# Patient Record
Sex: FEMALE | Race: BLACK OR AFRICAN AMERICAN | URBAN - METROPOLITAN AREA
[De-identification: names, ages, dates, MRNs, and addresses within clinical notes are randomized per-mention and may not be internally consistent; named-entity substitution may affect disease eponyms.]

---

## 2020-06-25 ENCOUNTER — LAB OUTSIDE AN ENCOUNTER (OUTPATIENT)
Dept: URBAN - METROPOLITAN AREA CLINIC 25 | Facility: CLINIC | Age: 45
End: 2020-06-25

## 2020-07-04 LAB
A/G RATIO: 1.1
ADALIMUMAB DRUG LEVEL: 13
ALBUMIN: 4.3
ALKALINE PHOSPHATASE: 54
ALT (SGPT): 11
ANTI-ADALIMUMAB ANTIBODY: 58
AST (SGOT): 21
BILIRUBIN, TOTAL: 0.3
BUN/CREATININE RATIO: 11
BUN: 9
C-REACTIVE PROTEIN, QUANT: 2
CALCIUM: 10.2
CARBON DIOXIDE, TOTAL: 23
CHLORIDE: 103
CREATININE: 0.81
EGFR IF AFRICN AM: 101
EGFR IF NONAFRICN AM: 88
FOLATE (FOLIC ACID), SERUM: 8.8
GLOBULIN, TOTAL: 3.8
GLUCOSE: 92
HBSAG SCREEN: NEGATIVE
HEMATOCRIT: 36
HEMOGLOBIN: 12
HEP B SURFACE AB, QUAL: NON REACTIVE
MCH: 27.5
MCHC: 33.3
MCV: 82
NRBC: (no result)
PLATELETS: 289
POTASSIUM: 3.6
PROTEIN, TOTAL: 8.1
QUANTIFERON CRITERIA: (no result)
QUANTIFERON INCUBATION: (no result)
QUANTIFERON MITOGEN VALUE: >10
QUANTIFERON NIL VALUE: 0.09
QUANTIFERON TB1 AG VALUE: 0.06
QUANTIFERON TB2 AG VALUE: 0.07
QUANTIFERON-TB GOLD PLUS: NEGATIVE
RBC: 4.37
RDW: 15.9
SODIUM: 141
VITAMIN B12: 157
VITAMIN D, 25-HYDROXY: 10.1
WBC: 4.6

## 2020-07-05 ENCOUNTER — LAB OUTSIDE AN ENCOUNTER (OUTPATIENT)
Dept: URBAN - METROPOLITAN AREA CLINIC 92 | Facility: CLINIC | Age: 45
End: 2020-07-05

## 2020-07-05 ENCOUNTER — TELEPHONE ENCOUNTER (OUTPATIENT)
Dept: URBAN - METROPOLITAN AREA CLINIC 92 | Facility: CLINIC | Age: 45
End: 2020-07-05

## 2020-07-05 RX ORDER — ERGOCALCIFEROL CAPSULES, 1.25 MG/1
1 CAPSULE CAPSULE ORAL
Qty: 8 CAPSULE | Refills: 0 | OUTPATIENT
Start: 2020-07-05 | End: 2020-08-30

## 2020-07-05 RX ORDER — CYANOCOBALAMIN (VITAMIN B-12) 500 MCG
2 TABLET TABLET ORAL ONCE A DAY
Qty: 90 | Refills: 3 | OUTPATIENT
Start: 2020-07-05 | End: 2021-06-30

## 2020-07-14 ENCOUNTER — OFFICE VISIT (OUTPATIENT)
Dept: URBAN - METROPOLITAN AREA CLINIC 83 | Facility: CLINIC | Age: 45
End: 2020-07-14
Payer: COMMERCIAL

## 2020-07-14 DIAGNOSIS — E53.8 VITAMIN B12 DEFICIENCY: ICD-10-CM

## 2020-07-14 PROCEDURE — 96372 THER/PROPH/DIAG INJ SC/IM: CPT | Performed by: INTERNAL MEDICINE

## 2020-07-14 RX ORDER — AZATHIOPRINE 50 1/1
TAKE 2 TABLETS (100 MG) BY ORAL ROUTE ONCE DAILY FOR 90 DAYS TABLET ORAL 1
Qty: 180 | Refills: 1 | Status: ACTIVE | COMMUNITY
Start: 2020-01-23 | End: 2020-07-21

## 2020-07-14 RX ORDER — CYANOCOBALAMIN (VITAMIN B-12) 500 MCG
2 TABLET TABLET ORAL ONCE A DAY
Qty: 90 | Refills: 3 | Status: ACTIVE | COMMUNITY
Start: 2020-07-05 | End: 2021-06-30

## 2020-07-14 RX ORDER — ERGOCALCIFEROL CAPSULES, 1.25 MG/1
1 CAPSULE CAPSULE ORAL
Qty: 8 CAPSULE | Refills: 0 | Status: ACTIVE | COMMUNITY
Start: 2020-07-05 | End: 2020-08-30

## 2020-07-21 ENCOUNTER — OFFICE VISIT (OUTPATIENT)
Dept: URBAN - METROPOLITAN AREA CLINIC 83 | Facility: CLINIC | Age: 45
End: 2020-07-21
Payer: COMMERCIAL

## 2020-07-21 DIAGNOSIS — E53.8 VITAMIN B12 DEFICIENCY: ICD-10-CM

## 2020-07-21 PROCEDURE — 96372 THER/PROPH/DIAG INJ SC/IM: CPT | Performed by: INTERNAL MEDICINE

## 2020-07-21 RX ORDER — AZATHIOPRINE 50 1/1
TAKE 2 TABLETS (100 MG) BY ORAL ROUTE ONCE DAILY FOR 90 DAYS TABLET ORAL 1
Qty: 180 | Refills: 1 | Status: ACTIVE | COMMUNITY
Start: 2020-01-23 | End: 2020-07-21

## 2020-07-21 RX ORDER — ERGOCALCIFEROL CAPSULES, 1.25 MG/1
1 CAPSULE CAPSULE ORAL
Qty: 8 CAPSULE | Refills: 0 | Status: ACTIVE | COMMUNITY
Start: 2020-07-05 | End: 2020-08-30

## 2020-07-21 RX ORDER — CYANOCOBALAMIN (VITAMIN B-12) 500 MCG
2 TABLET TABLET ORAL ONCE A DAY
Qty: 90 | Refills: 3 | Status: ACTIVE | COMMUNITY
Start: 2020-07-05 | End: 2021-06-30

## 2020-07-28 ENCOUNTER — OFFICE VISIT (OUTPATIENT)
Dept: URBAN - METROPOLITAN AREA CLINIC 83 | Facility: CLINIC | Age: 45
End: 2020-07-28
Payer: COMMERCIAL

## 2020-07-28 DIAGNOSIS — E53.8 VITAMIN B12 DEFICIENCY: ICD-10-CM

## 2020-07-28 PROCEDURE — 96372 THER/PROPH/DIAG INJ SC/IM: CPT | Performed by: INTERNAL MEDICINE

## 2020-07-30 ENCOUNTER — WEB ENCOUNTER (OUTPATIENT)
Dept: URBAN - METROPOLITAN AREA CLINIC 84 | Facility: CLINIC | Age: 45
End: 2020-07-30

## 2020-07-30 ENCOUNTER — OFFICE VISIT (OUTPATIENT)
Dept: URBAN - METROPOLITAN AREA CLINIC 92 | Facility: CLINIC | Age: 45
End: 2020-07-30
Payer: COMMERCIAL

## 2020-07-30 ENCOUNTER — OFFICE VISIT (OUTPATIENT)
Dept: URBAN - METROPOLITAN AREA CLINIC 84 | Facility: CLINIC | Age: 45
End: 2020-07-30
Payer: COMMERCIAL

## 2020-07-30 DIAGNOSIS — E53.8 VITAMIN B 12 DEFICIENCY: ICD-10-CM

## 2020-07-30 DIAGNOSIS — Z78.9 HEPATITIS B VACCINATION NOT UP TO DATE: ICD-10-CM

## 2020-07-30 DIAGNOSIS — Z79.899 ADALIMUMAB (HUMIRA) LONG-TERM USE: ICD-10-CM

## 2020-07-30 DIAGNOSIS — E56.9 VITAMIN D DEFICIENCY: ICD-10-CM

## 2020-07-30 DIAGNOSIS — K50.80 CROHN'S DISEASE OF BOTH SMALL AND LARGE INTESTINE: ICD-10-CM

## 2020-07-30 DIAGNOSIS — K44.9 HIATAL HERNIA - S/P SURGERY: ICD-10-CM

## 2020-07-30 DIAGNOSIS — Z23 ENCOUNTER FOR IMMUNIZATION: ICD-10-CM

## 2020-07-30 PROBLEM — 6051000175105: Status: ACTIVE | Noted: 2020-07-30

## 2020-07-30 PROCEDURE — 82607 VITAMIN B-12: CPT | Performed by: INTERNAL MEDICINE

## 2020-07-30 PROCEDURE — 90746 HEPB VACCINE 3 DOSE ADULT IM: CPT | Performed by: INTERNAL MEDICINE

## 2020-07-30 PROCEDURE — 82306 VITAMIN D 25 HYDROXY: CPT | Performed by: INTERNAL MEDICINE

## 2020-07-30 PROCEDURE — 99214 OFFICE O/P EST MOD 30 MIN: CPT | Performed by: INTERNAL MEDICINE

## 2020-07-30 PROCEDURE — 90471 IMMUNIZATION ADMIN: CPT | Performed by: INTERNAL MEDICINE

## 2020-07-30 RX ORDER — CYANOCOBALAMIN (VITAMIN B-12) 500 MCG
2 TABLET TABLET ORAL ONCE A DAY
Qty: 90 | Refills: 3 | Status: ACTIVE | COMMUNITY
Start: 2020-07-05 | End: 2021-06-30

## 2020-07-30 RX ORDER — ERGOCALCIFEROL CAPSULES, 1.25 MG/1
1 CAPSULE CAPSULE ORAL
Qty: 8 CAPSULE | Refills: 0 | Status: ACTIVE | COMMUNITY
Start: 2020-07-05 | End: 2020-08-30

## 2020-07-30 NOTE — HPI-OTHER HISTORIES
Jan 2020 -  missed humira dose for abt 4 weeks. took it this week on monday. ran out of imuran last week. bowel freq 2-3 times per day. occasional dysphagia.  Dec  2019 -  Was having trouble getting meds from pharmacy, missed humira for 3 weeks. took humira yesterday. remains  on imuran 100 mg daily. Having more diarrhea now than usual. no rectal bleeding. was feeling much better when was regular on humira and imuran. now experiecing flareup symptoms. no fever / abdominal pain.  Labs Nov 2019 : CR 0.66, T BILI <0.2, ALP 60, AST 22, ALT 13, WBC 4.3, HB (10.5), , lipase 41, CRP 1. Oct 2019 -  aug labs reviewed. normal wbc count. Hb 11. normal LFTs. normal amylase and lipase. no diarrhea. no abdominal pain. no rectal bleeding. compliant to meds.  Aug 2019 -  s/p repair of hiatal hernia + fundoplication. stopped meds on her own during periop period ( stopped in early june - restarted in late july). but continued imuran. now back on humira every week and imuran.  normal cbc. normal kidney and liver function. no abdominal pain. occasional diarrhea. no fever. no rectal bleeding.  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 Has bowel movements 1 to 2 times a day, no anal bleeding, no abdominal pain. Presently on humira and imuran and reports some relief of symptoms. Patient is scheduled to see thoracic surgeon and will likely stop the medicine one to two weeks prior to surgery as directed by thoracic surgeon. April 2019 -  CT enterography march 2019  -normal small bowel. suspected perianal / perirectal fistula. very large hiatal hernia containing most of the stomach / part of pancreas and spleen. heterogenous uterus.  Humira dose increased to weekly. Also added imuran. labs march 2019 - low vit D. normal B 12. normal lfts. low ferritin. Hb 9. Fecal calprotectin 106.  Bowels are more formed. feeling better. got injectafor 1 shot. getting 1 more dose next week.  March 2019 -  Colonoscopy in feb 2019 revealed post op anatomy, likely ileocolonic anastomosis in TC. patchy pseudopolyps in entire colon. TC diverticulosis. perianal fistula and skin tags. mucosal bridge in sigmoid colon. Moderately active inflammation was present in ileocolonic anastomosis Bx but remainder colon biopsy was normal. TPMT activity - normal. Slightly lower Humira drug levels ( 7.8).  Humira antibody 196 ( intermediate antibody level) Labs in dec 2018 - Hb 10.4, low b 12 166, normal CMP.  TB neg. Hep B neg. CRP elevated to 18.  low Vit D 10. Continues to have diarrhea. ALso describes abdominal discomfort. Pt feels crohns is not well controlled.  DEC 2018 - Patient was last seen in May 2018 by Dr. Rodriges. On Humira. Last labs from April 2018: hgb 10, normal LFT's, normal renal function.  EGD Nov 2017 : large hiatal hernia, Schatzki's ring. Has previously been on Remicade - but stopped working in Dec 2017. Colonoscopy June 2017 : inflammed colon, ulcerated iliocolonic anastamosis, normal neoti pseudopolyps in the colon noted. Path: confirmed inflammation from ileal biopsy, colonic biopsy did not reveal any active crohn's disease. CD was diagnosed at age 11. On Humira in early 2018. Feels like its working. Bowel freq 5-6 times per day, loose stools. No FH of colon cancer. noticing hypopigmentation on skin. s/p right hemicolectomy in 1989. Perianal fistula not draining as bad as before.

## 2020-07-30 NOTE — HPI-TODAY'S VISIT:
July 2020 : doing imuran 100 mg per day and weekly Humira. took meds for vit D def. on B 12 injections. feels CD is in better control. no abdominal pain. no diarrhea. occasional gerd. EGD in scheduled for next week.   Colonoscopy in March 2020 revealed normal neoterminal ileum.  3 large pedunculated pseudopolyps in the transverse colon which were biopsied extensively.  Benign intrinsic appearing stenosis at approximately 60 cm, biopsied, large polypoid lesion at 55 cm also biopsied.  Relatively normal-appearing rectum, rectosigmoid, sigmoid colon and descending colon.  Extensive perianal skin tags.  Perianal skin discoloration.  Multiple semi-pedunculated polyps in the sigmoid colon.  Ulceration at the ileocolonic anastomosis.  Biopsies from the anastomosis confirmed active colitis with mild activity.  Colonic stricture at 60 cm also revealed active colitis.  All other biopsies revealed benign mucosa.  MR enterography February 2020 Limited exam because of motion artifact, short segment 4 cm thickening and mural enhancement of the terminal ileum.  Short segment of narrowing of the distal sigmoid and hepatic flexure without inflammatory changes.  Long segment thickening of the descending colon.  Images were not obtained to the level of the rectum and anus to evaluate the suspected left perianal and perirectal fistula seen on prior CT.

## 2020-08-04 ENCOUNTER — CLAIMS CREATED FROM THE CLAIM WINDOW (OUTPATIENT)
Dept: URBAN - METROPOLITAN AREA CLINIC 83 | Facility: CLINIC | Age: 45
End: 2020-08-04
Payer: COMMERCIAL

## 2020-08-04 DIAGNOSIS — E53.8 VITAMIN B12 DEFICIENCY: ICD-10-CM

## 2020-08-04 DIAGNOSIS — D51.9 VITAMIN B12 DEFICIENCY ANEMIA, UNSPECIFIED: ICD-10-CM

## 2020-08-04 DIAGNOSIS — D51.0 VITAMIN B12 DEFICIENCY ANEMIA DUE TO INTRINSIC FACTOR DEFICIENCY: ICD-10-CM

## 2020-08-04 DIAGNOSIS — D51.1 VITAMIN B12 DEFICIENCY ANEMIA DUE TO MALABSORPTION WITH PROTEINURIA: ICD-10-CM

## 2020-08-04 PROCEDURE — 96372 THER/PROPH/DIAG INJ SC/IM: CPT | Performed by: INTERNAL MEDICINE

## 2020-08-04 RX ORDER — CYANOCOBALAMIN (VITAMIN B-12) 500 MCG
2 TABLET TABLET ORAL ONCE A DAY
Qty: 90 | Refills: 3 | Status: ACTIVE | COMMUNITY
Start: 2020-07-05 | End: 2021-06-30

## 2020-08-04 RX ORDER — ERGOCALCIFEROL CAPSULES, 1.25 MG/1
1 CAPSULE CAPSULE ORAL
Qty: 8 CAPSULE | Refills: 0 | Status: ACTIVE | COMMUNITY
Start: 2020-07-05 | End: 2020-08-30

## 2020-08-11 ENCOUNTER — OFFICE VISIT (OUTPATIENT)
Dept: URBAN - METROPOLITAN AREA CLINIC 83 | Facility: CLINIC | Age: 45
End: 2020-08-11
Payer: COMMERCIAL

## 2020-08-11 DIAGNOSIS — E53.8 VITAMIN B12 DEFICIENCY: ICD-10-CM

## 2020-08-11 PROCEDURE — 96372 THER/PROPH/DIAG INJ SC/IM: CPT | Performed by: INTERNAL MEDICINE

## 2020-08-11 RX ORDER — CYANOCOBALAMIN (VITAMIN B-12) 500 MCG
2 TABLET TABLET ORAL ONCE A DAY
Qty: 90 | Refills: 3 | Status: ACTIVE | COMMUNITY
Start: 2020-07-05 | End: 2021-06-30

## 2020-08-11 RX ORDER — ERGOCALCIFEROL CAPSULES, 1.25 MG/1
1 CAPSULE CAPSULE ORAL
Qty: 8 CAPSULE | Refills: 0 | Status: ACTIVE | COMMUNITY
Start: 2020-07-05 | End: 2020-08-30

## 2020-08-13 ENCOUNTER — OFFICE VISIT (OUTPATIENT)
Dept: URBAN - METROPOLITAN AREA SURGERY CENTER 20 | Facility: SURGERY CENTER | Age: 45
End: 2020-08-13
Payer: COMMERCIAL

## 2020-08-13 DIAGNOSIS — K21.9 ACID REFLUX: ICD-10-CM

## 2020-08-13 PROCEDURE — G8907 PT DOC NO EVENTS ON DISCHARG: HCPCS | Performed by: INTERNAL MEDICINE

## 2020-08-13 PROCEDURE — 43235 EGD DIAGNOSTIC BRUSH WASH: CPT | Performed by: INTERNAL MEDICINE

## 2020-08-13 RX ORDER — CYANOCOBALAMIN (VITAMIN B-12) 500 MCG
2 TABLET TABLET ORAL ONCE A DAY
Qty: 90 | Refills: 3 | Status: ACTIVE | COMMUNITY
Start: 2020-07-05 | End: 2021-06-30

## 2020-08-13 RX ORDER — ERGOCALCIFEROL CAPSULES, 1.25 MG/1
1 CAPSULE CAPSULE ORAL
Qty: 8 CAPSULE | Refills: 0 | Status: ACTIVE | COMMUNITY
Start: 2020-07-05 | End: 2020-08-30

## 2020-08-18 ENCOUNTER — OFFICE VISIT (OUTPATIENT)
Dept: URBAN - METROPOLITAN AREA CLINIC 83 | Facility: CLINIC | Age: 45
End: 2020-08-18
Payer: COMMERCIAL

## 2020-08-18 DIAGNOSIS — E53.8 VITAMIN B12 DEFICIENCY: ICD-10-CM

## 2020-08-18 PROCEDURE — 96372 THER/PROPH/DIAG INJ SC/IM: CPT | Performed by: INTERNAL MEDICINE

## 2020-08-18 RX ORDER — ERGOCALCIFEROL CAPSULES, 1.25 MG/1
1 CAPSULE CAPSULE ORAL
Qty: 8 CAPSULE | Refills: 0 | Status: ACTIVE | COMMUNITY
Start: 2020-07-05 | End: 2020-08-30

## 2020-08-18 RX ORDER — CYANOCOBALAMIN (VITAMIN B-12) 500 MCG
2 TABLET TABLET ORAL ONCE A DAY
Qty: 90 | Refills: 3 | Status: ACTIVE | COMMUNITY
Start: 2020-07-05 | End: 2021-06-30

## 2020-08-25 ENCOUNTER — OFFICE VISIT (OUTPATIENT)
Dept: URBAN - METROPOLITAN AREA CLINIC 83 | Facility: CLINIC | Age: 45
End: 2020-08-25

## 2020-08-31 ENCOUNTER — OFFICE VISIT (OUTPATIENT)
Dept: URBAN - METROPOLITAN AREA CLINIC 83 | Facility: CLINIC | Age: 45
End: 2020-08-31

## 2020-09-01 ENCOUNTER — OFFICE VISIT (OUTPATIENT)
Dept: URBAN - METROPOLITAN AREA CLINIC 83 | Facility: CLINIC | Age: 45
End: 2020-09-01
Payer: COMMERCIAL

## 2020-09-01 DIAGNOSIS — Z23 ENCOUNTER FOR IMMUNIZATION: ICD-10-CM

## 2020-09-01 DIAGNOSIS — E53.8 VITAMIN B12 DEFICIENCY: ICD-10-CM

## 2020-09-01 PROCEDURE — 90746 HEPB VACCINE 3 DOSE ADULT IM: CPT | Performed by: INTERNAL MEDICINE

## 2020-09-01 PROCEDURE — 90471 IMMUNIZATION ADMIN: CPT | Performed by: INTERNAL MEDICINE

## 2020-09-01 PROCEDURE — 96372 THER/PROPH/DIAG INJ SC/IM: CPT | Performed by: INTERNAL MEDICINE

## 2020-09-01 RX ORDER — CYANOCOBALAMIN (VITAMIN B-12) 500 MCG
2 TABLET TABLET ORAL ONCE A DAY
Qty: 90 | Refills: 3 | Status: ACTIVE | COMMUNITY
Start: 2020-07-05 | End: 2021-06-30

## 2020-10-06 ENCOUNTER — OFFICE VISIT (OUTPATIENT)
Dept: URBAN - METROPOLITAN AREA CLINIC 83 | Facility: CLINIC | Age: 45
End: 2020-10-06
Payer: COMMERCIAL

## 2020-10-06 DIAGNOSIS — E53.8 VITAMIN B12 DEFICIENCY: ICD-10-CM

## 2020-10-06 PROCEDURE — 96372 THER/PROPH/DIAG INJ SC/IM: CPT | Performed by: INTERNAL MEDICINE

## 2020-10-06 RX ORDER — CYANOCOBALAMIN (VITAMIN B-12) 500 MCG
2 TABLET TABLET ORAL ONCE A DAY
Qty: 90 | Refills: 3 | Status: ACTIVE | COMMUNITY
Start: 2020-07-05 | End: 2021-06-30

## 2020-10-27 ENCOUNTER — LAB OUTSIDE AN ENCOUNTER (OUTPATIENT)
Dept: URBAN - METROPOLITAN AREA CLINIC 25 | Facility: CLINIC | Age: 45
End: 2020-10-27

## 2020-10-28 ENCOUNTER — TELEPHONE ENCOUNTER (OUTPATIENT)
Dept: URBAN - METROPOLITAN AREA CLINIC 92 | Facility: CLINIC | Age: 45
End: 2020-10-28

## 2020-10-28 LAB
VITAMIN B12: 349
VITAMIN D, 25-HYDROXY: 16.9

## 2020-10-28 RX ORDER — ERGOCALCIFEROL CAPSULES, 1.25 MG/1
1 CAPSULE CAPSULE ORAL
Qty: 8 CAPSULE | Refills: 0
Start: 2020-07-05

## 2020-10-28 RX ORDER — ADALIMUMAB 40MG/0.4ML
1 SUBQ  EVERY WEEK KIT SUBCUTANEOUS
Qty: 4 | Refills: 12 | OUTPATIENT

## 2020-10-29 PROBLEM — 71833008 CROHN'S DISEASE OF SMALL AND LARGE INTESTINES: Status: ACTIVE | Noted: 2020-10-29

## 2020-11-03 ENCOUNTER — OFFICE VISIT (OUTPATIENT)
Dept: URBAN - METROPOLITAN AREA CLINIC 83 | Facility: CLINIC | Age: 45
End: 2020-11-03

## 2020-11-04 ENCOUNTER — OFFICE VISIT (OUTPATIENT)
Dept: URBAN - METROPOLITAN AREA CLINIC 83 | Facility: CLINIC | Age: 45
End: 2020-11-04

## 2020-11-05 ENCOUNTER — TELEPHONE ENCOUNTER (OUTPATIENT)
Dept: URBAN - METROPOLITAN AREA CLINIC 25 | Facility: CLINIC | Age: 45
End: 2020-11-05

## 2020-11-12 ENCOUNTER — TELEPHONE ENCOUNTER (OUTPATIENT)
Dept: URBAN - METROPOLITAN AREA CLINIC 25 | Facility: CLINIC | Age: 45
End: 2020-11-12

## 2020-11-25 ENCOUNTER — TELEPHONE ENCOUNTER (OUTPATIENT)
Dept: URBAN - METROPOLITAN AREA CLINIC 25 | Facility: CLINIC | Age: 45
End: 2020-11-25

## 2020-12-08 ENCOUNTER — OFFICE VISIT (OUTPATIENT)
Dept: URBAN - METROPOLITAN AREA CLINIC 83 | Facility: CLINIC | Age: 45
End: 2020-12-08
Payer: COMMERCIAL

## 2020-12-08 DIAGNOSIS — Z12.11 COLON CANCER SCREENING: ICD-10-CM

## 2020-12-08 PROCEDURE — 993 AGA: Performed by: INTERNAL MEDICINE

## 2020-12-08 RX ORDER — ERGOCALCIFEROL CAPSULES, 1.25 MG/1
1 CAPSULE CAPSULE ORAL
Qty: 8 CAPSULE | Refills: 0 | Status: ACTIVE | COMMUNITY
Start: 2020-07-05

## 2020-12-08 RX ORDER — ADALIMUMAB 40MG/0.4ML
1 SUBQ  EVERY WEEK KIT SUBCUTANEOUS
Qty: 4 | Refills: 12 | Status: ACTIVE | COMMUNITY

## 2020-12-08 RX ORDER — CYANOCOBALAMIN (VITAMIN B-12) 500 MCG
2 TABLET TABLET ORAL ONCE A DAY
Qty: 90 | Refills: 3 | Status: ACTIVE | COMMUNITY
Start: 2020-07-05 | End: 2021-06-30

## 2020-12-29 ENCOUNTER — TELEPHONE ENCOUNTER (OUTPATIENT)
Dept: URBAN - METROPOLITAN AREA CLINIC 92 | Facility: CLINIC | Age: 45
End: 2020-12-29

## 2020-12-29 RX ORDER — ADALIMUMAB 40MG/0.4ML
1 SUBQ  EVERY WEEK KIT SUBCUTANEOUS
Qty: 4 | Refills: 12

## 2021-01-05 ENCOUNTER — OFFICE VISIT (OUTPATIENT)
Dept: URBAN - METROPOLITAN AREA CLINIC 83 | Facility: CLINIC | Age: 46
End: 2021-01-05
Payer: COMMERCIAL

## 2021-01-05 DIAGNOSIS — E53.8 B12 DEFICIENCY: ICD-10-CM

## 2021-01-05 PROCEDURE — 96372 THER/PROPH/DIAG INJ SC/IM: CPT | Performed by: INTERNAL MEDICINE

## 2021-01-05 RX ORDER — ADALIMUMAB 40MG/0.4ML
1 SUBQ  EVERY WEEK KIT SUBCUTANEOUS
Qty: 4 | Refills: 12 | Status: ACTIVE | COMMUNITY

## 2021-01-05 RX ORDER — CYANOCOBALAMIN (VITAMIN B-12) 500 MCG
2 TABLET TABLET ORAL ONCE A DAY
Qty: 90 | Refills: 3 | Status: ACTIVE | COMMUNITY
Start: 2020-07-05 | End: 2021-06-30

## 2021-01-05 RX ORDER — ERGOCALCIFEROL CAPSULES, 1.25 MG/1
1 CAPSULE CAPSULE ORAL
Qty: 8 CAPSULE | Refills: 0 | Status: ACTIVE | COMMUNITY
Start: 2020-07-05

## 2021-02-02 ENCOUNTER — OFFICE VISIT (OUTPATIENT)
Dept: URBAN - METROPOLITAN AREA CLINIC 83 | Facility: CLINIC | Age: 46
End: 2021-02-02
Payer: COMMERCIAL

## 2021-02-02 DIAGNOSIS — Z12.11 COLON CANCER SCREENING: ICD-10-CM

## 2021-02-02 PROCEDURE — 993 AGA: Performed by: INTERNAL MEDICINE

## 2021-02-02 RX ORDER — ADALIMUMAB 40MG/0.4ML
1 SUBQ  EVERY WEEK KIT SUBCUTANEOUS
Qty: 4 | Refills: 12 | Status: ACTIVE | COMMUNITY

## 2021-02-02 RX ORDER — ERGOCALCIFEROL CAPSULES, 1.25 MG/1
1 CAPSULE CAPSULE ORAL
Qty: 8 CAPSULE | Refills: 0 | Status: ACTIVE | COMMUNITY
Start: 2020-07-05

## 2021-02-02 RX ORDER — CYANOCOBALAMIN (VITAMIN B-12) 500 MCG
2 TABLET TABLET ORAL ONCE A DAY
Qty: 90 | Refills: 3 | Status: ACTIVE | COMMUNITY
Start: 2020-07-05 | End: 2021-06-30

## 2021-02-12 ENCOUNTER — OFFICE VISIT (OUTPATIENT)
Dept: URBAN - METROPOLITAN AREA CLINIC 25 | Facility: CLINIC | Age: 46
End: 2021-02-12

## 2021-02-12 NOTE — HPI-TODAY'S VISIT:
February 2021 Normal vitamin B12, low vitamin D at 16.9, TB test negative in June 2020, Humira drug levels 13, antibody levels low titer at 58, CRP 2. EGD August 2020 reveals a small amount of food in the gastric body, evidence of fundoplication, normal esophagus, no biopsies.

## 2021-02-12 NOTE — HPI-OTHER HISTORIES
July 2020 : doing imuran 100 mg per day and weekly Humira. took meds for vit D def. on B 12 injections. feels CD is in better control. no abdominal pain. no diarrhea. occasional gerd. EGD in scheduled for next week. Colonoscopy in March 2020 revealed normal neoterminal ileum.  3 large pedunculated pseudopolyps in the transverse colon which were biopsied extensively.  Benign intrinsic appearing stenosis at approximately 60 cm, biopsied, large polypoid lesion at 55 cm also biopsied.  Relatively normal-appearing rectum, rectosigmoid, sigmoid colon and descending colon.  Extensive perianal skin tags.  Perianal skin discoloration.  Multiple semi-pedunculated polyps in the sigmoid colon.  Ulceration at the ileocolonic anastomosis. Biopsies from the anastomosis confirmed active colitis with mild activity.  Colonic stricture at 60 cm also revealed active colitis.  All other biopsies revealed benign mucosa. MR enterography February 2020 Limited exam because of motion artifact, short segment 4 cm thickening and mural enhancement of the terminal ileum.  Short segment of narrowing of the distal sigmoid and hepatic flexure without inflammatory changes.  Long segment thickening of the descending colon.  Images were not obtained to the level of the rectum and anus to evaluate the suspected left perianal and perirectal fistula seen on prior CT. Jan 2020 -  missed humira dose for abt 4 weeks. took it this week on monday. ran out of imuran last week. bowel freq 2-3 times per day. occasional dysphagia.  Dec  2019 -  Was having trouble getting meds from pharmacy, missed humira for 3 weeks. took humira yesterday. remains  on imuran 100 mg daily. Having more diarrhea now than usual. no rectal bleeding. was feeling much better when was regular on humira and imuran. now experiecing flareup symptoms. no fever / abdominal pain.  Labs Nov 2019 : CR 0.66, T BILI <0.2, ALP 60, AST 22, ALT 13, WBC 4.3, HB (10.5), , lipase 41, CRP 1. Oct 2019 -  aug labs reviewed. normal wbc count. Hb 11. normal LFTs. normal amylase and lipase. no diarrhea. no abdominal pain. no rectal bleeding. compliant to meds.  Aug 2019 -  s/p repair of hiatal hernia + fundoplication. stopped meds on her own during periop period ( stopped in early june - restarted in late july). but continued imuran. now back on humira every week and imuran.  normal cbc. normal kidney and liver function. no abdominal pain. occasional diarrhea. no fever. no rectal bleeding.  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 Has bowel movements 1 to 2 times a day, no anal bleeding, no abdominal pain. Presently on humira and imuran and reports some relief of symptoms. Patient is scheduled to see thoracic surgeon and will likely stop the medicine one to two weeks prior to surgery as directed by thoracic surgeon. April 2019 -  CT enterography march 2019  -normal small bowel. suspected perianal / perirectal fistula. very large hiatal hernia containing most of the stomach / part of pancreas and spleen. heterogenous uterus.  Humira dose increased to weekly. Also added imuran. labs march 2019 - low vit D. normal B 12. normal lfts. low ferritin. Hb 9. Fecal calprotectin 106.  Bowels are more formed. feeling better. got injectafor 1 shot. getting 1 more dose next week.  March 2019 -  Colonoscopy in feb 2019 revealed post op anatomy, likely ileocolonic anastomosis in TC. patchy pseudopolyps in entire colon. TC diverticulosis. perianal fistula and skin tags. mucosal bridge in sigmoid colon. Moderately active inflammation was present in ileocolonic anastomosis Bx but remainder colon biopsy was normal. TPMT activity - normal. Slightly lower Humira drug levels ( 7.8).  Humira antibody 196 ( intermediate antibody level) Labs in dec 2018 - Hb 10.4, low b 12 166, normal CMP.  TB neg. Hep B neg. CRP elevated to 18.  low Vit D 10. Continues to have diarrhea. ALso describes abdominal discomfort. Pt feels crohns is not well controlled.  DEC 2018 - Patient was last seen in May 2018 by Dr. Rodriges. On Humira. Last labs from April 2018: hgb 10, normal LFT's, normal renal function.  EGD Nov 2017 : large hiatal hernia, Schatzki's ring. Has previously been on Remicade - but stopped working in Dec 2017. Colonoscopy June 2017 : inflammed colon, ulcerated iliocolonic anastamosis, normal neoti pseudopolyps in the colon noted. Path: confirmed inflammation from ileal biopsy, colonic biopsy did not reveal any active crohn's disease. CD was diagnosed at age 11. On Humira in early 2018. Feels like its working. Bowel freq 5-6 times per day, loose stools. No FH of colon cancer. noticing hypopigmentation on skin. s/p right hemicolectomy in 1989. Perianal fistula not draining as bad as before.

## 2021-03-02 ENCOUNTER — OFFICE VISIT (OUTPATIENT)
Dept: URBAN - METROPOLITAN AREA CLINIC 83 | Facility: CLINIC | Age: 46
End: 2021-03-02
Payer: COMMERCIAL

## 2021-03-02 DIAGNOSIS — Z23 ENCOUNTER FOR IMMUNIZATION: ICD-10-CM

## 2021-03-02 PROCEDURE — 90746 HEPB VACCINE 3 DOSE ADULT IM: CPT | Performed by: INTERNAL MEDICINE

## 2021-03-02 PROCEDURE — 90471 IMMUNIZATION ADMIN: CPT | Performed by: INTERNAL MEDICINE

## 2021-03-02 RX ORDER — ADALIMUMAB 40MG/0.4ML
1 SUBQ  EVERY WEEK KIT SUBCUTANEOUS
Qty: 4 | Refills: 12 | Status: ACTIVE | COMMUNITY

## 2021-03-02 RX ORDER — CYANOCOBALAMIN (VITAMIN B-12) 500 MCG
2 TABLET TABLET ORAL ONCE A DAY
Qty: 90 | Refills: 3 | Status: ACTIVE | COMMUNITY
Start: 2020-07-05 | End: 2021-06-30

## 2021-03-02 RX ORDER — ERGOCALCIFEROL CAPSULES, 1.25 MG/1
1 CAPSULE CAPSULE ORAL
Qty: 8 CAPSULE | Refills: 0 | Status: ACTIVE | COMMUNITY
Start: 2020-07-05

## 2021-03-09 ENCOUNTER — OFFICE VISIT (OUTPATIENT)
Dept: URBAN - METROPOLITAN AREA CLINIC 83 | Facility: CLINIC | Age: 46
End: 2021-03-09
Payer: COMMERCIAL

## 2021-03-09 DIAGNOSIS — E53.8 VITAMIN B12 DEFICIENCY: ICD-10-CM

## 2021-03-09 PROCEDURE — 96372 THER/PROPH/DIAG INJ SC/IM: CPT | Performed by: INTERNAL MEDICINE

## 2021-03-09 RX ORDER — ERGOCALCIFEROL CAPSULES, 1.25 MG/1
1 CAPSULE CAPSULE ORAL
Qty: 8 CAPSULE | Refills: 0 | Status: ACTIVE | COMMUNITY
Start: 2020-07-05

## 2021-03-09 RX ORDER — ADALIMUMAB 40MG/0.4ML
1 SUBQ  EVERY WEEK KIT SUBCUTANEOUS
Qty: 4 | Refills: 12 | Status: ACTIVE | COMMUNITY

## 2021-03-09 RX ORDER — CYANOCOBALAMIN (VITAMIN B-12) 500 MCG
2 TABLET TABLET ORAL ONCE A DAY
Qty: 90 | Refills: 3 | Status: ACTIVE | COMMUNITY
Start: 2020-07-05 | End: 2021-06-30

## 2021-03-26 ENCOUNTER — TELEPHONE ENCOUNTER (OUTPATIENT)
Dept: URBAN - METROPOLITAN AREA CLINIC 92 | Facility: CLINIC | Age: 46
End: 2021-03-26

## 2021-04-29 ENCOUNTER — WEB ENCOUNTER (OUTPATIENT)
Dept: URBAN - METROPOLITAN AREA CLINIC 84 | Facility: CLINIC | Age: 46
End: 2021-04-29

## 2021-04-29 ENCOUNTER — OFFICE VISIT (OUTPATIENT)
Dept: URBAN - METROPOLITAN AREA CLINIC 83 | Facility: CLINIC | Age: 46
End: 2021-04-29
Payer: COMMERCIAL

## 2021-04-29 ENCOUNTER — OFFICE VISIT (OUTPATIENT)
Dept: URBAN - METROPOLITAN AREA CLINIC 84 | Facility: CLINIC | Age: 46
End: 2021-04-29
Payer: COMMERCIAL

## 2021-04-29 DIAGNOSIS — Z79.899 ADALIMUMAB (HUMIRA) LONG-TERM USE: ICD-10-CM

## 2021-04-29 DIAGNOSIS — E53.8 VITAMIN B 12 DEFICIENCY: ICD-10-CM

## 2021-04-29 DIAGNOSIS — K44.9 HIATAL HERNIA - S/P SURGERY: ICD-10-CM

## 2021-04-29 DIAGNOSIS — K50.80 CROHN'S DISEASE OF BOTH SMALL AND LARGE INTESTINE WITHOUT COMPLICATION: ICD-10-CM

## 2021-04-29 DIAGNOSIS — E53.8 VITAMIN B12 DEFICIENCY: ICD-10-CM

## 2021-04-29 DIAGNOSIS — E56.9 VITAMIN D DEFICIENCY: ICD-10-CM

## 2021-04-29 PROCEDURE — 96372 THER/PROPH/DIAG INJ SC/IM: CPT | Performed by: INTERNAL MEDICINE

## 2021-04-29 PROCEDURE — 99214 OFFICE O/P EST MOD 30 MIN: CPT | Performed by: INTERNAL MEDICINE

## 2021-04-29 RX ORDER — ERGOCALCIFEROL CAPSULES, 1.25 MG/1
1 CAPSULE CAPSULE ORAL
Qty: 8 CAPSULE | Refills: 0 | Status: ON HOLD | COMMUNITY
Start: 2020-07-05

## 2021-04-29 RX ORDER — ADALIMUMAB 40MG/0.4ML
1 SUBQ  EVERY WEEK KIT SUBCUTANEOUS
Qty: 4 | Refills: 12 | Status: ACTIVE | COMMUNITY

## 2021-04-29 RX ORDER — CYANOCOBALAMIN (VITAMIN B-12) 500 MCG
2 TABLET TABLET ORAL ONCE A DAY
Qty: 90 | Refills: 3 | Status: ACTIVE | COMMUNITY
Start: 2020-07-05 | End: 2021-06-30

## 2021-04-29 RX ORDER — ADALIMUMAB 40MG/0.4ML
1 SUBQ Q  WEEK KIT SUBCUTANEOUS
Qty: 12 | Refills: 0 | OUTPATIENT
Start: 2021-04-29 | End: 2021-07-28

## 2021-04-29 RX ORDER — ERGOCALCIFEROL CAPSULES, 1.25 MG/1
1 CAPSULE CAPSULE ORAL
Qty: 8 CAPSULE | Refills: 0 | Status: ACTIVE | COMMUNITY
Start: 2020-07-05

## 2021-04-29 NOTE — HPI-TODAY'S VISIT:
April 2021:  Recovered from covid 19 without hospitalization in late 2020.   On weekly Humira. Stopped taking Imuran because she thinks this was causing memory loss.  Normal vitamin B12, low vitamin D at 16.9, TB test negative in June 2020, Humira drug levels 13, antibody levels low titer at 58, CRP 2. EGD August 2020 reveals a small amount of food in the gastric body, evidence of fundoplication, normal esophagus, no biopsies. 1 BM per day. No rectal bleeding. No abdominal pain. On monthly vit B 12 injection.

## 2021-05-23 LAB
A/G RATIO: 1.1
ADALIMUMAB DRUG LEVEL: 12
ALBUMIN: 4.1
ALKALINE PHOSPHATASE: 71
ALT (SGPT): 16
ANTI-ADALIMUMAB ANTIBODY: 29
AST (SGOT): 18
BILIRUBIN, TOTAL: <0.2
BUN/CREATININE RATIO: 17
BUN: 12
C-REACTIVE PROTEIN, QUANT: 4
CALCIUM: 9.5
CARBON DIOXIDE, TOTAL: 23
CHLORIDE: 106
CREATININE: 0.7
EGFR IF AFRICN AM: 120
EGFR IF NONAFRICN AM: 104
FERRITIN, SERUM: 88
GLOBULIN, TOTAL: 3.9
GLUCOSE: 100
HEMATOCRIT: 31.2
HEMOGLOBIN: 10.3
MCH: 27.5
MCHC: 33
MCV: 83
NRBC: (no result)
PLATELETS: 276
POTASSIUM: 3.7
PROTEIN, TOTAL: 8
RBC: 3.74
RDW: 15.1
SODIUM: 143
VITAMIN B12: 384
VITAMIN D, 25-HYDROXY: 19.7
WBC: 5.1

## 2021-05-24 ENCOUNTER — TELEPHONE ENCOUNTER (OUTPATIENT)
Dept: URBAN - METROPOLITAN AREA CLINIC 92 | Facility: CLINIC | Age: 46
End: 2021-05-24

## 2021-05-24 RX ORDER — ERGOCALCIFEROL 1.25 MG/1
1 CAPSULE CAPSULE, LIQUID FILLED ORAL
Qty: 8 CAPSULE | Refills: 0 | OUTPATIENT
Start: 2021-05-24 | End: 2021-07-19

## 2021-06-03 ENCOUNTER — ERX REFILL RESPONSE (OUTPATIENT)
Dept: URBAN - METROPOLITAN AREA CLINIC 25 | Facility: CLINIC | Age: 46
End: 2021-06-03

## 2021-06-03 RX ORDER — ADALIMUMAB 40MG/0.4ML
INJECT 40MG(1 SYRINGE) ONCE A WEEK KIT SUBCUTANEOUS
Qty: 4 | Refills: 4

## 2021-07-29 ENCOUNTER — WEB ENCOUNTER (OUTPATIENT)
Dept: URBAN - METROPOLITAN AREA CLINIC 84 | Facility: CLINIC | Age: 46
End: 2021-07-29

## 2021-07-29 ENCOUNTER — OFFICE VISIT (OUTPATIENT)
Dept: URBAN - METROPOLITAN AREA CLINIC 84 | Facility: CLINIC | Age: 46
End: 2021-07-29
Payer: COMMERCIAL

## 2021-07-29 ENCOUNTER — OFFICE VISIT (OUTPATIENT)
Dept: URBAN - METROPOLITAN AREA CLINIC 84 | Facility: CLINIC | Age: 46
End: 2021-07-29

## 2021-07-29 DIAGNOSIS — E53.8 VITAMIN B 12 DEFICIENCY: ICD-10-CM

## 2021-07-29 DIAGNOSIS — E56.9 VITAMIN D DEFICIENCY: ICD-10-CM

## 2021-07-29 DIAGNOSIS — K50.80 CROHN'S DISEASE OF BOTH SMALL AND LARGE INTESTINE WITHOUT COMPLICATION: ICD-10-CM

## 2021-07-29 DIAGNOSIS — Z79.899 ADALIMUMAB (HUMIRA) LONG-TERM USE: ICD-10-CM

## 2021-07-29 PROCEDURE — 99214 OFFICE O/P EST MOD 30 MIN: CPT | Performed by: INTERNAL MEDICINE

## 2021-07-29 PROCEDURE — 96372 THER/PROPH/DIAG INJ SC/IM: CPT | Performed by: INTERNAL MEDICINE

## 2021-07-29 RX ORDER — ERGOCALCIFEROL 1.25 MG/1
1 CAPSULE CAPSULE, LIQUID FILLED ORAL
Qty: 8 CAPSULE | Refills: 0 | OUTPATIENT
Start: 2021-07-29 | End: 2021-09-23

## 2021-07-29 RX ORDER — ERGOCALCIFEROL CAPSULES, 1.25 MG/1
1 CAPSULE CAPSULE ORAL
Qty: 8 CAPSULE | Refills: 0 | Status: ACTIVE | COMMUNITY
Start: 2020-07-05

## 2021-07-29 RX ORDER — ADALIMUMAB 40MG/0.4ML
1 SUBQ  EVERY WEEK KIT SUBCUTANEOUS
Qty: 4 | Refills: 12 | Status: DISCONTINUED | COMMUNITY

## 2021-07-29 RX ORDER — ADALIMUMAB 40MG/0.4ML
1 SUBQ  EVERY WEEK KIT SUBCUTANEOUS
Qty: 4 | Refills: 12 | Status: ACTIVE | COMMUNITY

## 2021-07-29 RX ORDER — ADALIMUMAB 40MG/0.4ML
INJECT 40MG(1 SYRINGE) ONCE A WEEK KIT SUBCUTANEOUS
Qty: 4 | Refills: 4 | Status: ON HOLD | COMMUNITY

## 2021-07-29 RX ORDER — ERGOCALCIFEROL CAPSULES, 1.25 MG/1
1 CAPSULE CAPSULE ORAL
Qty: 8 CAPSULE | Refills: 0 | Status: ON HOLD | COMMUNITY
Start: 2020-07-05

## 2021-07-29 NOTE — HPI-TODAY'S VISIT:
July 2021 visit:   On Weekly Humira.Stopped 6MP because this this causes memory issues. She takes this prn when she thinks she get looser stools.  vit D def - didnt take meds since not covered by insurance. Also not recently taking B 12 injections. Did not have fecal calpro done as ordered. 1-2 BM per day. No rectal bleeding. Feels like Humira is working well.   Labs from May 2021 revealed normal vitamin B12 384, ferritin 88, C-reactive protein normal at 4, continued vitamin D deficiency 19.7, normal kidney function, normal liver enzymes, low Humira antibody 29, adequate Humira drug levels 12, slight anemia with a hemoglobin 10.3 otherwise normal CBC.  Fecal calprotectin was not done.  TB test was not done

## 2021-07-29 NOTE — HPI-OTHER HISTORIES
April 2021:  Recovered from covid 19 without hospitalization in late 2020.   On weekly Humira. Stopped taking Imuran because she thinks this was causing memory loss.  Normal vitamin B12, low vitamin D at 16.9, TB test negative in June 2020, Humira drug levels 13, antibody levels low titer at 58, CRP 2. EGD August 2020 reveals a small amount of food in the gastric body, evidence of fundoplication, normal esophagus, no biopsies. 1 BM per day. No rectal bleeding. No abdominal pain. On monthly vit B 12 injection.  July 2020 : doing imuran 100 mg per day and weekly Humira. took meds for vit D def. on B 12 injections. feels CD is in better control. no abdominal pain. no diarrhea. occasional gerd. EGD in scheduled for next week. Colonoscopy in March 2020 revealed normal neoterminal ileum.  3 large pedunculated pseudopolyps in the transverse colon which were biopsied extensively.  Benign intrinsic appearing stenosis at approximately 60 cm, biopsied, large polypoid lesion at 55 cm also biopsied.  Relatively normal-appearing rectum, rectosigmoid, sigmoid colon and descending colon.  Extensive perianal skin tags.  Perianal skin discoloration.  Multiple semi-pedunculated polyps in the sigmoid colon.  Ulceration at the ileocolonic anastomosis. Biopsies from the anastomosis confirmed active colitis with mild activity.  Colonic stricture at 60 cm also revealed active colitis.  All other biopsies revealed benign mucosa. MR enterography February 2020 Limited exam because of motion artifact, short segment 4 cm thickening and mural enhancement of the terminal ileum.  Short segment of narrowing of the distal sigmoid and hepatic flexure without inflammatory changes.  Long segment thickening of the descending colon.  Images were not obtained to the level of the rectum and anus to evaluate the suspected left perianal and perirectal fistula seen on prior CT. Jan 2020 -  missed humira dose for abt 4 weeks. took it this week on monday. ran out of imuran last week. bowel freq 2-3 times per day. occasional dysphagia.  Dec  2019 -  Was having trouble getting meds from pharmacy, missed humira for 3 weeks. took humira yesterday. remains  on imuran 100 mg daily. Having more diarrhea now than usual. no rectal bleeding. was feeling much better when was regular on humira and imuran. now experiecing flareup symptoms. no fever / abdominal pain.  Labs Nov 2019 : CR 0.66, T BILI <0.2, ALP 60, AST 22, ALT 13, WBC 4.3, HB (10.5), , lipase 41, CRP 1. Oct 2019 -  aug labs reviewed. normal wbc count. Hb 11. normal LFTs. normal amylase and lipase. no diarrhea. no abdominal pain. no rectal bleeding. compliant to meds.  Aug 2019 -  s/p repair of hiatal hernia + fundoplication. stopped meds on her own during periop period ( stopped in early june - restarted in late july). but continued imuran. now back on humira every week and imuran.  normal cbc. normal kidney and liver function. no abdominal pain. occasional diarrhea. no fever. no rectal bleeding.  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 Has bowel movements 1 to 2 times a day, no anal bleeding, no abdominal pain. Presently on humira and imuran and reports some relief of symptoms. Patient is scheduled to see thoracic surgeon and will likely stop the medicine one to two weeks prior to surgery as directed by thoracic surgeon. April 2019 -  CT enterography march 2019  -normal small bowel. suspected perianal / perirectal fistula. very large hiatal hernia containing most of the stomach / part of pancreas and spleen. heterogenous uterus.  Humira dose increased to weekly. Also added imuran. labs march 2019 - low vit D. normal B 12. normal lfts. low ferritin. Hb 9. Fecal calprotectin 106.  Bowels are more formed. feeling better. got injectafor 1 shot. getting 1 more dose next week.  March 2019 -  Colonoscopy in feb 2019 revealed post op anatomy, likely ileocolonic anastomosis in TC. patchy pseudopolyps in entire colon. TC diverticulosis. perianal fistula and skin tags. mucosal bridge in sigmoid colon. Moderately active inflammation was present in ileocolonic anastomosis Bx but remainder colon biopsy was normal. TPMT activity - normal. Slightly lower Humira drug levels ( 7.8).  Humira antibody 196 ( intermediate antibody level) Labs in dec 2018 - Hb 10.4, low b 12 166, normal CMP.  TB neg. Hep B neg. CRP elevated to 18.  low Vit D 10. Continues to have diarrhea. ALso describes abdominal discomfort. Pt feels crohns is not well controlled.  DEC 2018 - Patient was last seen in May 2018 by Dr. Rodriges. On [facility]. Last labs from April 2018: hgb 10, normal LFT's, normal renal function.  EGD Nov 2017 : large hiatal hernia, Schatzki's ring. Has previously been on Remicade - but stopped working in Dec 2017. Colonoscopy June 2017 : inflammed colon, ulcerated iliocolonic anastamosis, normal neoti pseudopolyps in the colon noted. Path: confirmed inflammation from ileal biopsy, colonic biopsy did not reveal any active crohn's disease. CD was diagnosed at age 11. On Humira in early 2018. Feels like its working. Bowel freq 5-6 times per day, loose stools. No FH of colon cancer. noticing hypopigmentation on skin. s/p right hemicolectomy in 1989. Perianal fistula not draining as bad as before.

## 2021-08-02 ENCOUNTER — TELEPHONE ENCOUNTER (OUTPATIENT)
Dept: URBAN - METROPOLITAN AREA CLINIC 5 | Facility: CLINIC | Age: 46
End: 2021-08-02

## 2021-08-10 ENCOUNTER — OFFICE VISIT (OUTPATIENT)
Dept: URBAN - METROPOLITAN AREA CLINIC 84 | Facility: CLINIC | Age: 46
End: 2021-08-10

## 2021-08-17 LAB
A/G RATIO: 1.2
ALBUMIN: 4.4
ALKALINE PHOSPHATASE: 75
ALT (SGPT): 17
AST (SGOT): 28
BILIRUBIN, TOTAL: 0.2
BUN/CREATININE RATIO: 11
BUN: 7
C-REACTIVE PROTEIN, QUANT: 4
CALCIUM: 9.7
CARBON DIOXIDE, TOTAL: 24
CHLORIDE: 105
CREATININE: 0.62
EGFR IF AFRICN AM: 125
EGFR IF NONAFRICN AM: 108
GLOBULIN, TOTAL: 3.8
GLUCOSE: 89
HEMATOCRIT: 31.9
HEMOGLOBIN: 10.4
MCH: 27.6
MCHC: 32.6
MCV: 85
NRBC: 1
PLATELETS: 252
POTASSIUM: 3.8
PROTEIN, TOTAL: 8.2
QUANTIFERON CRITERIA: (no result)
QUANTIFERON INCUBATION: (no result)
QUANTIFERON MITOGEN VALUE: >10
QUANTIFERON NIL VALUE: 0.18
QUANTIFERON TB1 AG VALUE: 0.18
QUANTIFERON TB2 AG VALUE: 0.21
QUANTIFERON-TB GOLD PLUS: NEGATIVE
RBC: 3.77
RDW: 15.7
SODIUM: 144
VITAMIN B12: 430
VITAMIN D, 25-HYDROXY: 16.6
WBC: 5.4

## 2021-08-26 ENCOUNTER — OFFICE VISIT (OUTPATIENT)
Dept: URBAN - METROPOLITAN AREA CLINIC 83 | Facility: CLINIC | Age: 46
End: 2021-08-26
Payer: COMMERCIAL

## 2021-08-26 DIAGNOSIS — E53.8 B12 DEFICIENCY: ICD-10-CM

## 2021-08-26 PROCEDURE — 96372 THER/PROPH/DIAG INJ SC/IM: CPT | Performed by: INTERNAL MEDICINE

## 2021-08-26 RX ORDER — ERGOCALCIFEROL 1.25 MG/1
1 CAPSULE CAPSULE, LIQUID FILLED ORAL
Qty: 8 CAPSULE | Refills: 0 | Status: ACTIVE | COMMUNITY
Start: 2021-07-29 | End: 2021-09-23

## 2021-08-26 RX ORDER — ADALIMUMAB 40MG/0.4ML
INJECT 40MG(1 SYRINGE) ONCE A WEEK KIT SUBCUTANEOUS
Qty: 4 | Refills: 4 | Status: ON HOLD | COMMUNITY

## 2021-08-26 RX ORDER — ERGOCALCIFEROL CAPSULES, 1.25 MG/1
1 CAPSULE CAPSULE ORAL
Qty: 8 CAPSULE | Refills: 0 | Status: ON HOLD | COMMUNITY
Start: 2020-07-05

## 2021-09-01 ENCOUNTER — LAB OUTSIDE AN ENCOUNTER (OUTPATIENT)
Dept: URBAN - METROPOLITAN AREA CLINIC 25 | Facility: CLINIC | Age: 46
End: 2021-09-01

## 2021-09-07 LAB — CALPROTECTIN, FECAL: 187

## 2021-09-30 ENCOUNTER — OFFICE VISIT (OUTPATIENT)
Dept: URBAN - METROPOLITAN AREA CLINIC 83 | Facility: CLINIC | Age: 46
End: 2021-09-30
Payer: COMMERCIAL

## 2021-09-30 DIAGNOSIS — E53.8 B12 DEFICIENCY: ICD-10-CM

## 2021-09-30 PROCEDURE — 96372 THER/PROPH/DIAG INJ SC/IM: CPT | Performed by: INTERNAL MEDICINE

## 2021-09-30 RX ORDER — ERGOCALCIFEROL CAPSULES, 1.25 MG/1
1 CAPSULE CAPSULE ORAL
Qty: 8 CAPSULE | Refills: 0 | Status: ON HOLD | COMMUNITY
Start: 2020-07-05

## 2021-09-30 RX ORDER — ADALIMUMAB 40MG/0.4ML
INJECT 40MG(1 SYRINGE) ONCE A WEEK KIT SUBCUTANEOUS
Qty: 4 | Refills: 4 | Status: ON HOLD | COMMUNITY

## 2021-10-28 ENCOUNTER — OFFICE VISIT (OUTPATIENT)
Dept: URBAN - METROPOLITAN AREA CLINIC 84 | Facility: CLINIC | Age: 46
End: 2021-10-28

## 2021-10-28 RX ORDER — ERGOCALCIFEROL CAPSULES, 1.25 MG/1
1 CAPSULE CAPSULE ORAL
Qty: 8 CAPSULE | Refills: 0 | Status: ON HOLD | COMMUNITY
Start: 2020-07-05

## 2021-10-28 RX ORDER — ADALIMUMAB 40MG/0.4ML
INJECT 40MG(1 SYRINGE) ONCE A WEEK KIT SUBCUTANEOUS
Qty: 4 | Refills: 4 | Status: ON HOLD | COMMUNITY

## 2021-10-29 ENCOUNTER — ERX REFILL RESPONSE (OUTPATIENT)
Dept: URBAN - METROPOLITAN AREA CLINIC 25 | Facility: CLINIC | Age: 46
End: 2021-10-29

## 2021-10-29 RX ORDER — ADALIMUMAB 40MG/0.4ML
INJECT 40MG(1 SYRINGE) ONCE A WEEK KIT SUBCUTANEOUS
Qty: 4 | Refills: 4 | OUTPATIENT

## 2021-10-29 RX ORDER — ADALIMUMAB 40MG/0.4ML
INJECT 40MG UNDER THE SKIN ONCE A WEEK KIT SUBCUTANEOUS
Qty: 4 EACH | Refills: 1 | OUTPATIENT

## 2021-11-11 ENCOUNTER — WEB ENCOUNTER (OUTPATIENT)
Dept: URBAN - METROPOLITAN AREA CLINIC 84 | Facility: CLINIC | Age: 46
End: 2021-11-11

## 2021-11-11 ENCOUNTER — LAB OUTSIDE AN ENCOUNTER (OUTPATIENT)
Dept: URBAN - METROPOLITAN AREA CLINIC 84 | Facility: CLINIC | Age: 46
End: 2021-11-11

## 2021-11-11 ENCOUNTER — OFFICE VISIT (OUTPATIENT)
Dept: URBAN - METROPOLITAN AREA CLINIC 84 | Facility: CLINIC | Age: 46
End: 2021-11-11
Payer: COMMERCIAL

## 2021-11-11 ENCOUNTER — OFFICE VISIT (OUTPATIENT)
Dept: URBAN - METROPOLITAN AREA CLINIC 83 | Facility: CLINIC | Age: 46
End: 2021-11-11
Payer: COMMERCIAL

## 2021-11-11 DIAGNOSIS — E56.9 VITAMIN D DEFICIENCY: ICD-10-CM

## 2021-11-11 DIAGNOSIS — E53.8 VITAMIN B 12 DEFICIENCY: ICD-10-CM

## 2021-11-11 DIAGNOSIS — K50.80 CROHN'S DISEASE OF BOTH SMALL AND LARGE INTESTINE WITHOUT COMPLICATION: ICD-10-CM

## 2021-11-11 DIAGNOSIS — Z79.899 ADALIMUMAB (HUMIRA) LONG-TERM USE: ICD-10-CM

## 2021-11-11 DIAGNOSIS — E53.8 VITAMIN B12 DEFICIENCY: ICD-10-CM

## 2021-11-11 PROCEDURE — 96372 THER/PROPH/DIAG INJ SC/IM: CPT | Performed by: INTERNAL MEDICINE

## 2021-11-11 PROCEDURE — 99214 OFFICE O/P EST MOD 30 MIN: CPT | Performed by: INTERNAL MEDICINE

## 2021-11-11 RX ORDER — ERGOCALCIFEROL CAPSULES, 1.25 MG/1
1 CAPSULE CAPSULE ORAL
Qty: 8 CAPSULE | Refills: 0 | Status: DISCONTINUED | COMMUNITY
Start: 2020-07-05

## 2021-11-11 RX ORDER — ADALIMUMAB 40MG/0.4ML
INJECT 40MG UNDER THE SKIN ONCE A WEEK KIT SUBCUTANEOUS
Qty: 4 EACH | Refills: 1 | Status: ACTIVE | COMMUNITY

## 2021-11-11 NOTE — HPI-OTHER HISTORIES
July 2021 visit:   On Weekly Humira.Stopped 6MP because this this causes memory issues. She takes this prn when she thinks she get looser stools.  vit D def - didnt take meds since not covered by insurance. Also not recently taking B 12 injections. Did not have fecal calpro done as ordered. 1-2 BM per day. No rectal bleeding. Feels like Humira is working well.   Labs from May 2021 revealed normal vitamin B12 384, ferritin 88, C-reactive protein normal at 4, continued vitamin D deficiency 19.7, normal kidney function, normal liver enzymes, low Humira antibody 29, adequate Humira drug levels 12, slight anemia with a hemoglobin 10.3 otherwise normal CBC.  Fecal calprotectin was not done.  TB test was not done  April 2021:  Recovered from covid 19 without hospitalization in late 2020.   On weekly Humira. Stopped taking Imuran because she thinks this was causing memory loss.  Normal vitamin B12, low vitamin D at 16.9, TB test negative in June 2020, Humira drug levels 13, antibody levels low titer at 58, CRP 2. EGD August 2020 reveals a small amount of food in the gastric body, evidence of fundoplication, normal esophagus, no biopsies. 1 BM per day. No rectal bleeding. No abdominal pain. On monthly vit B 12 injection.  July 2020 : doing imuran 100 mg per day and weekly Humira. took meds for vit D def. on B 12 injections. feels CD is in better control. no abdominal pain. no diarrhea. occasional gerd. EGD in scheduled for next week. Colonoscopy in March 2020 revealed normal neoterminal ileum.  3 large pedunculated pseudopolyps in the transverse colon which were biopsied extensively.  Benign intrinsic appearing stenosis at approximately 60 cm, biopsied, large polypoid lesion at 55 cm also biopsied.  Relatively normal-appearing rectum, rectosigmoid, sigmoid colon and descending colon.  Extensive perianal skin tags.  Perianal skin discoloration.  Multiple semi-pedunculated polyps in the sigmoid colon.  Ulceration at the ileocolonic anastomosis. Biopsies from the anastomosis confirmed active colitis with mild activity.  Colonic stricture at 60 cm also revealed active colitis.  All other biopsies revealed benign mucosa. MR enterography February 2020 Limited exam because of motion artifact, short segment 4 cm thickening and mural enhancement of the terminal ileum.  Short segment of narrowing of the distal sigmoid and hepatic flexure without inflammatory changes.  Long segment thickening of the descending colon.  Images were not obtained to the level of the rectum and anus to evaluate the suspected left perianal and perirectal fistula seen on prior CT. Jan 2020 -  missed humira dose for abt 4 weeks. took it this week on monday. ran out of imuran last week. bowel freq 2-3 times per day. occasional dysphagia.  Dec  2019 -  Was having trouble getting meds from pharmacy, missed humira for 3 weeks. took humira yesterday. remains  on imuran 100 mg daily. Having more diarrhea now than usual. no rectal bleeding. was feeling much better when was regular on humira and imuran. now experiecing flareup symptoms. no fever / abdominal pain.  Labs Nov 2019 : CR 0.66, T BILI <0.2, ALP 60, AST 22, ALT 13, WBC 4.3, HB (10.5), , lipase 41, CRP 1. Oct 2019 -  aug labs reviewed. normal wbc count. Hb 11. normal LFTs. normal amylase and lipase. no diarrhea. no abdominal pain. no rectal bleeding. compliant to meds.  Aug 2019 -  s/p repair of hiatal hernia + fundoplication. stopped meds on her own during periop period ( stopped in early june - restarted in late july). but continued imuran. now back on humira every week and imuran.  normal cbc. normal kidney and liver function. no abdominal pain. occasional diarrhea. no fever. no rectal bleeding.  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 Has bowel movements 1 to 2 times a day, no anal bleeding, no abdominal pain. Presently on humira and imuran and reports some relief of symptoms. Patient is scheduled to see thoracic surgeon and will likely stop the medicine one to two weeks prior to surgery as directed by thoracic surgeon. April 2019 -  CT enterography march 2019  -normal small bowel. suspected perianal / perirectal fistula. very large hiatal hernia containing most of the stomach / part of pancreas and spleen. heterogenous uterus.  Humira dose increased to weekly. Also added imuran. labs march 2019 - low vit D. normal B 12. normal lfts. low ferritin. Hb 9. Fecal calprotectin 106.  Bowels are more formed. feeling better. got injectafor 1 shot. getting 1 more dose next week.  March 2019 -  Colonoscopy in feb 2019 revealed post op anatomy, likely ileocolonic anastomosis in TC. patchy pseudopolyps in entire colon. TC diverticulosis. perianal fistula and skin tags. mucosal bridge in sigmoid colon. Moderately active inflammation was present in ileocolonic anastomosis Bx but remainder colon biopsy was normal. TPMT activity - normal. Slightly lower Humira drug levels ( 7.8).  Humira antibody 196 ( intermediate antibody level) Labs in dec 2018 - Hb 10.4, low b 12 166, normal CMP.  TB neg. Hep B neg. CRP elevated to 18.  low Vit D 10. Continues to have diarrhea. ALso describes abdominal discomfort. Pt feels crohns is not well controlled.  DEC 2018 - Patient was last seen in May 2018 by Dr. Rodriges. On Humira. Last labs from April 2018: hgb 10, normal LFT's, normal renal function.  EGD Nov 2017 : large hiatal hernia, Schatzki's ring. Has previously been on Remicade - but stopped working in Dec 2017. Colonoscopy June 2017 : inflammed colon, ulcerated iliocolonic anastamosis, normal neoti pseudopolyps in the colon noted. Path: confirmed inflammation from ileal biopsy, colonic biopsy did not reveal any active crohn's disease. CD was diagnosed at age 11. On Humira in early 2018. Feels like its working. Bowel freq 5-6 times per day, loose stools. No FH of colon cancer. noticing hypopigmentation on skin. s/p right hemicolectomy in 1989. Perianal fistula not draining as bad as before.

## 2021-11-11 NOTE — HPI-TODAY'S VISIT:
November 2021 visit:    Elevated fecal calprotectin of 187 in September 2021.  Labs in August 2021 B12 430, normal C-reactive protein, low vitamin D 16, TB test negative, normal CMP, hemoglobin low 10.4  completed vit d treatment, now also using otc vit D. Weekly humira. Regular formed stools. on b 12 injections.  no heartburn.  no dysphagia.

## 2021-11-16 ENCOUNTER — TELEPHONE ENCOUNTER (OUTPATIENT)
Dept: URBAN - METROPOLITAN AREA CLINIC 92 | Facility: CLINIC | Age: 46
End: 2021-11-16

## 2021-12-02 ENCOUNTER — OFFICE VISIT (OUTPATIENT)
Dept: URBAN - METROPOLITAN AREA CLINIC 83 | Facility: CLINIC | Age: 46
End: 2021-12-02
Payer: COMMERCIAL

## 2021-12-02 DIAGNOSIS — E53.8 B12 DEFICIENCY: ICD-10-CM

## 2021-12-02 PROCEDURE — 96372 THER/PROPH/DIAG INJ SC/IM: CPT | Performed by: INTERNAL MEDICINE

## 2021-12-02 RX ORDER — ADALIMUMAB 40MG/0.4ML
INJECT 40MG UNDER THE SKIN ONCE A WEEK KIT SUBCUTANEOUS
Qty: 4 EACH | Refills: 1 | Status: ACTIVE | COMMUNITY

## 2021-12-10 LAB
A/G RATIO: 1.1
ADALIMUMAB DRUG LEVEL: 14
ALBUMIN: 4.1
ALKALINE PHOSPHATASE: 60
ALT (SGPT): 14
ANTI-ADALIMUMAB ANTIBODY: <25
AST (SGOT): 20
BILIRUBIN, TOTAL: <0.2
BUN/CREATININE RATIO: 12
BUN: 10
C-REACTIVE PROTEIN, QUANT: 5
CALCIUM: 9.4
CARBON DIOXIDE, TOTAL: 24
CHLORIDE: 105
CREATININE: 0.83
EGFR IF AFRICN AM: 97
EGFR IF NONAFRICN AM: 84
GLOBULIN, TOTAL: 3.9
GLUCOSE: 86
HEMATOCRIT: 30.7
HEMOGLOBIN: 10
MCH: 26.8
MCHC: 32.6
MCV: 82
NRBC: (no result)
PLATELETS: 259
POTASSIUM: 3.9
PROTEIN, TOTAL: 8
RBC: 3.73
RDW: 15
SODIUM: 143
VITAMIN B12: 1701
VITAMIN D, 25-HYDROXY: 30.2
WBC: 6.1

## 2021-12-15 ENCOUNTER — TELEPHONE ENCOUNTER (OUTPATIENT)
Dept: URBAN - METROPOLITAN AREA CLINIC 92 | Facility: CLINIC | Age: 46
End: 2021-12-15

## 2021-12-15 RX ORDER — SODIUM, POTASSIUM,MAG SULFATES 17.5-3.13G
1 KIT SOLUTION, RECONSTITUTED, ORAL ORAL
Qty: 1 KIT (354ML) | Refills: 0 | OUTPATIENT
Start: 2021-12-15 | End: 2021-12-16

## 2021-12-21 ENCOUNTER — ERX REFILL RESPONSE (OUTPATIENT)
Dept: URBAN - METROPOLITAN AREA CLINIC 25 | Facility: CLINIC | Age: 46
End: 2021-12-21

## 2021-12-21 ENCOUNTER — OFFICE VISIT (OUTPATIENT)
Dept: URBAN - METROPOLITAN AREA SURGERY CENTER 20 | Facility: SURGERY CENTER | Age: 46
End: 2021-12-21

## 2021-12-21 RX ORDER — ADALIMUMAB 40MG/0.4ML
INJECT 40MG UNDER THE SKIN ONCE A WEEK KIT SUBCUTANEOUS
Qty: 4 EACH | Refills: 1 | OUTPATIENT

## 2021-12-21 RX ORDER — ADALIMUMAB 40MG/0.4ML
INJECT 40MG UNDER THE SKIN ONCE A WEEK KIT SUBCUTANEOUS
Qty: 6 PRE-FILLED PEN SYRINGE | Refills: 6 | OUTPATIENT

## 2022-01-05 ENCOUNTER — OFFICE VISIT (OUTPATIENT)
Dept: URBAN - METROPOLITAN AREA CLINIC 83 | Facility: CLINIC | Age: 47
End: 2022-01-05

## 2022-02-10 ENCOUNTER — LAB OUTSIDE AN ENCOUNTER (OUTPATIENT)
Dept: URBAN - METROPOLITAN AREA CLINIC 25 | Facility: CLINIC | Age: 47
End: 2022-02-10

## 2022-02-10 ENCOUNTER — OFFICE VISIT (OUTPATIENT)
Dept: URBAN - METROPOLITAN AREA CLINIC 83 | Facility: CLINIC | Age: 47
End: 2022-02-10
Payer: COMMERCIAL

## 2022-02-10 ENCOUNTER — OFFICE VISIT (OUTPATIENT)
Dept: URBAN - METROPOLITAN AREA CLINIC 84 | Facility: CLINIC | Age: 47
End: 2022-02-10
Payer: COMMERCIAL

## 2022-02-10 ENCOUNTER — WEB ENCOUNTER (OUTPATIENT)
Dept: URBAN - METROPOLITAN AREA CLINIC 84 | Facility: CLINIC | Age: 47
End: 2022-02-10

## 2022-02-10 VITALS
BODY MASS INDEX: 30.51 KG/M2 | HEART RATE: 99 BPM | WEIGHT: 206 LBS | TEMPERATURE: 98 F | DIASTOLIC BLOOD PRESSURE: 82 MMHG | SYSTOLIC BLOOD PRESSURE: 120 MMHG | HEIGHT: 69 IN

## 2022-02-10 DIAGNOSIS — R19.7 DIARRHEA, UNSPECIFIED TYPE: ICD-10-CM

## 2022-02-10 DIAGNOSIS — K50.80 CROHN'S DISEASE OF BOTH SMALL AND LARGE INTESTINE WITHOUT COMPLICATION: ICD-10-CM

## 2022-02-10 DIAGNOSIS — E53.8 VITAMIN B 12 DEFICIENCY: ICD-10-CM

## 2022-02-10 DIAGNOSIS — E56.9 VITAMIN D DEFICIENCY: ICD-10-CM

## 2022-02-10 DIAGNOSIS — E53.8 B12 DEFICIENCY: ICD-10-CM

## 2022-02-10 PROCEDURE — 96372 THER/PROPH/DIAG INJ SC/IM: CPT | Performed by: INTERNAL MEDICINE

## 2022-02-10 PROCEDURE — 99214 OFFICE O/P EST MOD 30 MIN: CPT | Performed by: INTERNAL MEDICINE

## 2022-02-10 RX ORDER — CIPROFLOXACIN HYDROCHLORIDE 500 MG/1
1 TABLET TABLET, FILM COATED ORAL
Qty: 20 TABLET | Refills: 0 | OUTPATIENT
Start: 2022-02-10 | End: 2022-02-20

## 2022-02-10 RX ORDER — ADALIMUMAB 40MG/0.4ML
INJECT 40MG UNDER THE SKIN ONCE A WEEK KIT SUBCUTANEOUS
Qty: 6 PRE-FILLED PEN SYRINGE | Refills: 6 | Status: ACTIVE | COMMUNITY

## 2022-02-10 RX ORDER — METRONIDAZOLE 500 MG/1
1 TABLET TABLET, FILM COATED ORAL THREE TIMES A DAY
Qty: 30 TABLET | Refills: 0 | OUTPATIENT
Start: 2022-02-10 | End: 2022-02-20

## 2022-02-10 NOTE — HPI-OTHER HISTORIES
November 2021 visit:    Elevated fecal calprotectin of 187 in September 2021.  Labs in August 2021 B12 430, normal C-reactive protein, low vitamin D 16, TB test negative, normal CMP, hemoglobin low 10.4  completed vit d treatment, now also using otc vit D. Weekly humira. Regular formed stools. on b 12 injections.  no heartburn.  no dysphagia.   July 2021 visit:   On Weekly Humira.Stopped 6MP because this this causes memory issues. She takes this prn when she thinks she get looser stools.  vit D def - didnt take meds since not covered by insurance. Also not recently taking B 12 injections. Did not have fecal calpro done as ordered. 1-2 BM per day. No rectal bleeding. Feels like Humira is working well.   Labs from May 2021 revealed normal vitamin B12 384, ferritin 88, C-reactive protein normal at 4, continued vitamin D deficiency 19.7, normal kidney function, normal liver enzymes, low Humira antibody 29, adequate Humira drug levels 12, slight anemia with a hemoglobin 10.3 otherwise normal CBC.  Fecal calprotectin was not done.  TB test was not done  April 2021:  Recovered from covid 19 without hospitalization in late 2020.   On weekly Humira. Stopped taking Imuran because she thinks this was causing memory loss.  Normal vitamin B12, low vitamin D at 16.9, TB test negative in June 2020, Humira drug levels 13, antibody levels low titer at 58, CRP 2. EGD August 2020 reveals a small amount of food in the gastric body, evidence of fundoplication, normal esophagus, no biopsies. 1 BM per day. No rectal bleeding. No abdominal pain. On monthly vit B 12 injection.  July 2020 : doing imuran 100 mg per day and weekly Humira. took meds for vit D def. on B 12 injections. feels CD is in better control. no abdominal pain. no diarrhea. occasional gerd. EGD in scheduled for next week. Colonoscopy in March 2020 revealed normal neoterminal ileum.  3 large pedunculated pseudopolyps in the transverse colon which were biopsied extensively.  Benign intrinsic appearing stenosis at approximately 60 cm, biopsied, large polypoid lesion at 55 cm also biopsied.  Relatively normal-appearing rectum, rectosigmoid, sigmoid colon and descending colon.  Extensive perianal skin tags.  Perianal skin discoloration.  Multiple semi-pedunculated polyps in the sigmoid colon.  Ulceration at the ileocolonic anastomosis. Biopsies from the anastomosis confirmed active colitis with mild activity.  Colonic stricture at 60 cm also revealed active colitis.  All other biopsies revealed benign mucosa. MR enterography February 2020 Limited exam because of motion artifact, short segment 4 cm thickening and mural enhancement of the terminal ileum.  Short segment of narrowing of the distal sigmoid and hepatic flexure without inflammatory changes.  Long segment thickening of the descending colon.  Images were not obtained to the level of the rectum and anus to evaluate the suspected left perianal and perirectal fistula seen on prior CT. Jan 2020 -  missed humira dose for abt 4 weeks. took it this week on monday. ran out of imuran last week. bowel freq 2-3 times per day. occasional dysphagia.  Dec  2019 -  Was having trouble getting meds from pharmacy, missed humira for 3 weeks. took humira yesterday. remains  on imuran 100 mg daily. Having more diarrhea now than usual. no rectal bleeding. was feeling much better when was regular on humira and imuran. now experiecing flareup symptoms. no fever / abdominal pain.  Labs Nov 2019 : CR 0.66, T BILI <0.2, ALP 60, AST 22, ALT 13, WBC 4.3, HB (10.5), , lipase 41, CRP 1. Oct 2019 -  aug labs reviewed. normal wbc count. Hb 11. normal LFTs. normal amylase and lipase. no diarrhea. no abdominal pain. no rectal bleeding. compliant to meds.  Aug 2019 -  s/p repair of hiatal hernia + fundoplication. stopped meds on her own during periop period ( stopped in early june - restarted in late july). but continued imuran. now back on humira every week and imuran.  normal cbc. normal kidney and liver function. no abdominal pain. occasional diarrhea. no fever. no rectal bleeding.  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 Has bowel movements 1 to 2 times a day, no anal bleeding, no abdominal pain. Presently on humira and imuran and reports some relief of symptoms. Patient is scheduled to see thoracic surgeon and will likely stop the medicine one to two weeks prior to surgery as directed by thoracic surgeon. April 2019 -  CT enterography march 2019  -normal small bowel. suspected perianal / perirectal fistula. very large hiatal hernia containing most of the stomach / part of pancreas and spleen. heterogenous uterus.  Humira dose increased to weekly. Also added imuran. labs march 2019 - low vit D. normal B 12. normal lfts. low ferritin. Hb 9. Fecal calprotectin 106.  Bowels are more formed. feeling better. got injectafor 1 shot. getting 1 more dose next week.  March 2019 -  Colonoscopy in feb 2019 revealed post op anatomy, likely ileocolonic anastomosis in TC. patchy pseudopolyps in entire colon. TC diverticulosis. perianal fistula and skin tags. mucosal bridge in sigmoid colon. Moderately active inflammation was present in ileocolonic anastomosis Bx but remainder colon biopsy was normal. TPMT activity - normal. Slightly lower Humira drug levels ( 7.8).  Humira antibody 196 ( intermediate antibody level) Labs in dec 2018 - Hb 10.4, low b 12 166, normal CMP.  TB neg. Hep B neg. CRP elevated to 18.  low Vit D 10. Continues to have diarrhea. ALso describes abdominal discomfort. Pt feels crohns is not well controlled.  DEC 2018 - Patient was last seen in May 2018 by Dr. Rodriges. On Humira. Last labs from April 2018: hgb 10, normal LFT's, normal renal function.  EGD Nov 2017 : large hiatal hernia, Schatzki's ring. Has previously been on Remicade - but stopped working in Dec 2017. Colonoscopy June 2017 : inflammed colon, ulcerated iliocolonic anastamosis, normal neoti pseudopolyps in the colon noted. Path: confirmed inflammation from ileal biopsy, colonic biopsy did not reveal any active crohn's disease. CD was diagnosed at age 11. On Humira in early 2018. Feels like its working. Bowel freq 5-6 times per day, loose stools. No FH of colon cancer. noticing hypopigmentation on skin. s/p right hemicolectomy in 1989. Perianal fistula not draining as bad as before.

## 2022-02-10 NOTE — HPI-TODAY'S VISIT:
Feb 2022 visit :   Labs from December 2021 adalimumab drug level 14 which is within therapeutic range, no antibodies present.  Normal B12 level.  Normal C-reactive protein, vitamin D level 30, normal CMP, anemia with a hemoglobin of 10, normal MCV 82.   Started having diarrhea , watery, scant blood, mild abdominal discomfort, no fever. on Humira weekly. was scheduled for colonoscopy but did not come in , now scheduled for march 2022.

## 2022-02-16 LAB
CALPROTECTIN, STOOL - QDX: (no result)
GASTROINTESTINAL PATHOGEN: (no result)

## 2022-02-18 ENCOUNTER — TELEPHONE ENCOUNTER (OUTPATIENT)
Dept: URBAN - METROPOLITAN AREA CLINIC 92 | Facility: CLINIC | Age: 47
End: 2022-02-18

## 2022-02-18 ENCOUNTER — TELEPHONE ENCOUNTER (OUTPATIENT)
Dept: URBAN - METROPOLITAN AREA CLINIC 6 | Facility: CLINIC | Age: 47
End: 2022-02-18

## 2022-02-18 ENCOUNTER — LAB OUTSIDE AN ENCOUNTER (OUTPATIENT)
Dept: URBAN - METROPOLITAN AREA CLINIC 92 | Facility: CLINIC | Age: 47
End: 2022-02-18

## 2022-02-18 RX ORDER — BUDESONIDE 3 MG/1
3 CAPSULES CAPSULE, COATED PELLETS ORAL ONCE A DAY
Qty: 90 CAPSULE | Refills: 1 | OUTPATIENT
Start: 2022-02-18

## 2022-02-21 ENCOUNTER — ERX REFILL RESPONSE (OUTPATIENT)
Dept: URBAN - METROPOLITAN AREA CLINIC 25 | Facility: CLINIC | Age: 47
End: 2022-02-21

## 2022-02-21 RX ORDER — ADALIMUMAB 40MG/0.4ML
INJECT 40MG UNDER THE SKIN ONCE A WEEK KIT SUBCUTANEOUS
Qty: 6 PRE-FILLED PEN SYRINGE | Refills: 6 | OUTPATIENT

## 2022-03-01 ENCOUNTER — CLAIMS CREATED FROM THE CLAIM WINDOW (OUTPATIENT)
Dept: URBAN - METROPOLITAN AREA CLINIC 4 | Facility: CLINIC | Age: 47
End: 2022-03-01
Payer: COMMERCIAL

## 2022-03-01 ENCOUNTER — OFFICE VISIT (OUTPATIENT)
Dept: URBAN - METROPOLITAN AREA SURGERY CENTER 20 | Facility: SURGERY CENTER | Age: 47
End: 2022-03-01
Payer: COMMERCIAL

## 2022-03-01 DIAGNOSIS — K50.812 CROHN'S DISEASE OF BOTH SMALL AND LARGE INTESTINE WITH INTESTINAL OBSTRUCTION: ICD-10-CM

## 2022-03-01 DIAGNOSIS — K31.89 FOCAL FOVEOLAR HYPERPLASIA: ICD-10-CM

## 2022-03-01 DIAGNOSIS — K63.5 BENIGN COLON POLYP: ICD-10-CM

## 2022-03-01 DIAGNOSIS — K91.89 OTHER POSTPROCEDURAL COMPLICATIONS AND DISORDERS OF DIGESTIVE SYSTEM: ICD-10-CM

## 2022-03-01 DIAGNOSIS — K63.89 GASEOUS DISTENTION OF INTESTINE DETERMINED BY X-RAY: ICD-10-CM

## 2022-03-01 DIAGNOSIS — R12 BURNING REFLUX: ICD-10-CM

## 2022-03-01 PROCEDURE — 88305 TISSUE EXAM BY PATHOLOGIST: CPT | Performed by: PATHOLOGY

## 2022-03-01 PROCEDURE — 43239 EGD BIOPSY SINGLE/MULTIPLE: CPT | Performed by: INTERNAL MEDICINE

## 2022-03-01 PROCEDURE — G8907 PT DOC NO EVENTS ON DISCHARG: HCPCS | Performed by: INTERNAL MEDICINE

## 2022-03-01 PROCEDURE — 45380 COLONOSCOPY AND BIOPSY: CPT | Performed by: INTERNAL MEDICINE

## 2022-03-01 PROCEDURE — 88341 IMHCHEM/IMCYTCHM EA ADD ANTB: CPT | Performed by: PATHOLOGY

## 2022-03-01 PROCEDURE — 88342 IMHCHEM/IMCYTCHM 1ST ANTB: CPT | Performed by: PATHOLOGY

## 2022-03-01 PROCEDURE — 88312 SPECIAL STAINS GROUP 1: CPT | Performed by: PATHOLOGY

## 2022-03-01 RX ORDER — ADALIMUMAB 40MG/0.4ML
INJECT 40MG UNDER THE SKIN ONCE A WEEK KIT SUBCUTANEOUS
Qty: 6 PRE-FILLED PEN SYRINGE | Refills: 6 | Status: ACTIVE | COMMUNITY

## 2022-03-01 RX ORDER — BUDESONIDE 3 MG/1
3 CAPSULES CAPSULE, COATED PELLETS ORAL ONCE A DAY
Qty: 90 CAPSULE | Refills: 1 | Status: ACTIVE | COMMUNITY
Start: 2022-02-18

## 2022-03-17 ENCOUNTER — TELEPHONE ENCOUNTER (OUTPATIENT)
Dept: URBAN - METROPOLITAN AREA CLINIC 92 | Facility: CLINIC | Age: 47
End: 2022-03-17

## 2022-03-17 ENCOUNTER — OFFICE VISIT (OUTPATIENT)
Dept: URBAN - METROPOLITAN AREA CLINIC 84 | Facility: CLINIC | Age: 47
End: 2022-03-17

## 2022-03-24 ENCOUNTER — WEB ENCOUNTER (OUTPATIENT)
Dept: URBAN - METROPOLITAN AREA CLINIC 84 | Facility: CLINIC | Age: 47
End: 2022-03-24

## 2022-03-24 ENCOUNTER — LAB OUTSIDE AN ENCOUNTER (OUTPATIENT)
Dept: URBAN - METROPOLITAN AREA CLINIC 84 | Facility: CLINIC | Age: 47
End: 2022-03-24

## 2022-03-24 ENCOUNTER — OFFICE VISIT (OUTPATIENT)
Dept: URBAN - METROPOLITAN AREA CLINIC 84 | Facility: CLINIC | Age: 47
End: 2022-03-24
Payer: COMMERCIAL

## 2022-03-24 ENCOUNTER — OFFICE VISIT (OUTPATIENT)
Dept: URBAN - METROPOLITAN AREA CLINIC 83 | Facility: CLINIC | Age: 47
End: 2022-03-24
Payer: COMMERCIAL

## 2022-03-24 DIAGNOSIS — E53.8 VITAMIN B 12 DEFICIENCY: ICD-10-CM

## 2022-03-24 DIAGNOSIS — K44.9 HIATAL HERNIA - S/P SURGERY: ICD-10-CM

## 2022-03-24 DIAGNOSIS — E56.9 VITAMIN D DEFICIENCY: ICD-10-CM

## 2022-03-24 DIAGNOSIS — E53.8 B12 DEFICIENCY: ICD-10-CM

## 2022-03-24 DIAGNOSIS — Z79.899 ADALIMUMAB (HUMIRA) LONG-TERM USE: ICD-10-CM

## 2022-03-24 DIAGNOSIS — R19.7 DIARRHEA, UNSPECIFIED TYPE: ICD-10-CM

## 2022-03-24 DIAGNOSIS — K50.80 CROHN'S DISEASE OF BOTH SMALL AND LARGE INTESTINE WITHOUT COMPLICATION: ICD-10-CM

## 2022-03-24 PROCEDURE — 99214 OFFICE O/P EST MOD 30 MIN: CPT | Performed by: INTERNAL MEDICINE

## 2022-03-24 PROCEDURE — 96372 THER/PROPH/DIAG INJ SC/IM: CPT | Performed by: INTERNAL MEDICINE

## 2022-03-24 RX ORDER — BUDESONIDE 3 MG/1
3 CAPSULES CAPSULE, COATED PELLETS ORAL ONCE A DAY
Qty: 90 CAPSULE | Refills: 1 | COMMUNITY
Start: 2022-02-18

## 2022-03-24 RX ORDER — ADALIMUMAB 40MG/0.4ML
INJECT 40MG UNDER THE SKIN ONCE A WEEK KIT SUBCUTANEOUS
Qty: 6 PRE-FILLED PEN SYRINGE | Refills: 6 | COMMUNITY

## 2022-03-24 RX ORDER — DIPHENOXYLATE HYDROCHLORIDE AND ATROPINE SULFATE 2.5; .025 MG/1; MG/1
1 TABLET AS NEEDED TABLET ORAL
Status: ACTIVE | COMMUNITY
Start: 2022-03-24

## 2022-03-24 RX ORDER — DIPHENOXYLATE HYDROCHLORIDE AND ATROPINE SULFATE 2.5; .025 MG/1; MG/1
1 TABLET AS NEEDED TABLET ORAL
OUTPATIENT
Start: 2022-03-24

## 2022-03-24 NOTE — HPI-TODAY'S VISIT:
March 2022 visit: Patient underwent a EGD and a colonoscopy in March 2022.  EGD revealed post fundoplication changes otherwise normal.  Colonoscopy revealed no visible endoscopic colitis throughout the colon but significant changes of remodeling from longstanding Crohn's disease present, right hemicolectomy with ileocolonic anastomosis that was patent but had ulceration, neoterminal ileum appeared normal, diverticulosis right distal to the anastomosis in the transverse colon, mildly stenotic appearing lumen in the sigmoid colon, descending colon and transverse colon, medium to large sized pedunculated and semipedunculated polyps present in parts of the colon that appeared to be benign. Gastric biopsy was benign, neoterminal ileum biopsies benign.  Biopsies from the anastomosis revealed chronic colitis with ulcer and granulation.  No endoscopic colitis seen in any biopsy.  All polyps were benign on biopsy. Stool test from February 22 revealed normal fecal calprotectin and also normal stool infection. Labs from December revealed normal B12, C-reactive protein, adequate Humira drug levels and low antibody levels.  Vitamin D 30, mild anemia with a hemoglobin of 10.  On budesonide. still having diarrhea. multiple BM per day. on weekly humira. on b 12 once a month.

## 2022-03-24 NOTE — HPI-OTHER HISTORIES
Feb 2022 visit :   Labs from December 2021 adalimumab drug level 14 which is within therapeutic range, no antibodies present.  Normal B12 level.  Normal C-reactive protein, vitamin D level 30, normal CMP, anemia with a hemoglobin of 10, normal MCV 82.   Started having diarrhea , watery, scant blood, mild abdominal discomfort, no fever. on Humira weekly. was scheduled for colonoscopy but did not come in , now scheduled for march 2022.  November 2021 visit:    Elevated fecal calprotectin of 187 in September 2021.  Labs in August 2021 B12 430, normal C-reactive protein, low vitamin D 16, TB test negative, normal CMP, hemoglobin low 10.4  completed vit d treatment, now also using otc vit D. Weekly humira. Regular formed stools. on b 12 injections.  no heartburn.  no dysphagia.   July 2021 visit:   On Weekly Humira.Stopped 6MP because this this causes memory issues. She takes this prn when she thinks she get looser stools.  vit D def - didnt take meds since not covered by insurance. Also not recently taking B 12 injections. Did not have fecal calpro done as ordered. 1-2 BM per day. No rectal bleeding. Feels like Humira is working well.   Labs from May 2021 revealed normal vitamin B12 384, ferritin 88, C-reactive protein normal at 4, continued vitamin D deficiency 19.7, normal kidney function, normal liver enzymes, low Humira antibody 29, adequate Humira drug levels 12, slight anemia with a hemoglobin 10.3 otherwise normal CBC.  Fecal calprotectin was not done.  TB test was not done  April 2021:  Recovered from covid 19 without hospitalization in late 2020.   On weekly Humira. Stopped taking Imuran because she thinks this was causing memory loss.  Normal vitamin B12, low vitamin D at 16.9, TB test negative in June 2020, Humira drug levels 13, antibody levels low titer at 58, CRP 2. EGD August 2020 reveals a small amount of food in the gastric body, evidence of fundoplication, normal esophagus, no biopsies. 1 BM per day. No rectal bleeding. No abdominal pain. On monthly vit B 12 injection.  July 2020 : doing imuran 100 mg per day and weekly Humira. took meds for vit D def. on B 12 injections. feels CD is in better control. no abdominal pain. no diarrhea. occasional gerd. EGD in scheduled for next week. Colonoscopy in March 2020 revealed normal neoterminal ileum.  3 large pedunculated pseudopolyps in the transverse colon which were biopsied extensively.  Benign intrinsic appearing stenosis at approximately 60 cm, biopsied, large polypoid lesion at 55 cm also biopsied.  Relatively normal-appearing rectum, rectosigmoid, sigmoid colon and descending colon.  Extensive perianal skin tags.  Perianal skin discoloration.  Multiple semi-pedunculated polyps in the sigmoid colon.  Ulceration at the ileocolonic anastomosis. Biopsies from the anastomosis confirmed active colitis with mild activity.  Colonic stricture at 60 cm also revealed active colitis.  All other biopsies revealed benign mucosa. MR enterography February 2020 Limited exam because of motion artifact, short segment 4 cm thickening and mural enhancement of the terminal ileum.  Short segment of narrowing of the distal sigmoid and hepatic flexure without inflammatory changes.  Long segment thickening of the descending colon.  Images were not obtained to the level of the rectum and anus to evaluate the suspected left perianal and perirectal fistula seen on prior CT. Jan 2020 -  missed humira dose for abt 4 weeks. took it this week on monday. ran out of imuran last week. bowel freq 2-3 times per day. occasional dysphagia.  Dec  2019 -  Was having trouble getting meds from pharmacy, missed humira for 3 weeks. took humira yesterday. remains  on imuran 100 mg daily. Having more diarrhea now than usual. no rectal bleeding. was feeling much better when was regular on humira and imuran. now experiecing flareup symptoms. no fever / abdominal pain.  Labs Nov 2019 : CR 0.66, T BILI <0.2, ALP 60, AST 22, ALT 13, WBC 4.3, HB (10.5), , lipase 41, CRP 1. Oct 2019 -  aug labs reviewed. normal wbc count. Hb 11. normal LFTs. normal amylase and lipase. no diarrhea. no abdominal pain. no rectal bleeding. compliant to meds.  Aug 2019 -  s/p repair of hiatal hernia + fundoplication. stopped meds on her own during periop period ( stopped in early june - restarted in late july). but continued imuran. now back on humira every week and imuran.  normal cbc. normal kidney and liver function. no abdominal pain. occasional diarrhea. no fever. no rectal bleeding.  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 Has bowel movements 1 to 2 times a day, no anal bleeding, no abdominal pain. Presently on humira and imuran and reports some relief of symptoms. Patient is scheduled to see thoracic surgeon and will likely stop the medicine one to two weeks prior to surgery as directed by thoracic surgeon. April 2019 -  CT enterography march 2019  -normal small bowel. suspected perianal / perirectal fistula. very large hiatal hernia containing most of the stomach / part of pancreas and spleen. heterogenous uterus.  Humira dose increased to weekly. Also added imuran. labs march 2019 - low vit D. normal B 12. normal lfts. low ferritin. Hb 9. Fecal calprotectin 106.  Bowels are more formed. feeling better. got injectafor 1 shot. getting 1 more dose next week.  March 2019 -  Colonoscopy in feb 2019 revealed post op anatomy, likely ileocolonic anastomosis in TC. patchy pseudopolyps in entire colon. TC diverticulosis. perianal fistula and skin tags. mucosal bridge in sigmoid colon. Moderately active inflammation was present in ileocolonic anastomosis Bx but remainder colon biopsy was normal. TPMT activity - normal. Slightly lower Humira drug levels ( 7.8).  Humira antibody 196 ( intermediate antibody level) Labs in dec 2018 - Hb 10.4, low b 12 166, normal CMP.  TB neg. Hep B neg. CRP elevated to 18.  low Vit D 10. Continues to have diarrhea. ALso describes abdominal discomfort. Pt feels crohns is not well controlled.  DEC 2018 - Patient was last seen in May 2018 by Dr. Rodriges. On Humira. Last labs from April 2018: hgb 10, normal LFT's, normal renal function.  EGD Nov 2017 : large hiatal hernia, Schatzki's ring. Has previously been on Remicade - but stopped working in Dec 2017. Colonoscopy June 2017 : inflammed colon, ulcerated iliocolonic anastamosis, normal neoti pseudopolyps in the colon noted. Path: confirmed inflammation from ileal biopsy, colonic biopsy did not reveal any active crohn's disease. CD was diagnosed at age 11. On Humira in early 2018. Feels like its working. Bowel freq 5-6 times per day, loose stools. No FH of colon cancer. noticing hypopigmentation on skin. s/p right hemicolectomy in 1989. Perianal fistula not draining as bad as before.

## 2022-04-26 ENCOUNTER — OFFICE VISIT (OUTPATIENT)
Dept: URBAN - METROPOLITAN AREA CLINIC 83 | Facility: CLINIC | Age: 47
End: 2022-04-26
Payer: COMMERCIAL

## 2022-04-26 DIAGNOSIS — E53.8 VITAMIN B12 DEFICIENCY: ICD-10-CM

## 2022-04-26 PROCEDURE — 96372 THER/PROPH/DIAG INJ SC/IM: CPT | Performed by: INTERNAL MEDICINE

## 2022-04-26 RX ORDER — ADALIMUMAB 40MG/0.4ML
INJECT 40MG UNDER THE SKIN ONCE A WEEK KIT SUBCUTANEOUS
Qty: 6 PRE-FILLED PEN SYRINGE | Refills: 6 | COMMUNITY

## 2022-04-26 RX ORDER — BUDESONIDE 3 MG/1
3 CAPSULES CAPSULE, COATED PELLETS ORAL ONCE A DAY
Qty: 90 CAPSULE | Refills: 1 | COMMUNITY
Start: 2022-02-18

## 2022-04-26 RX ORDER — DIPHENOXYLATE HYDROCHLORIDE AND ATROPINE SULFATE 2.5; .025 MG/1; MG/1
1 TABLET AS NEEDED TABLET ORAL
Status: ACTIVE | COMMUNITY
Start: 2022-03-24

## 2022-05-26 ENCOUNTER — OFFICE VISIT (OUTPATIENT)
Dept: URBAN - METROPOLITAN AREA CLINIC 83 | Facility: CLINIC | Age: 47
End: 2022-05-26
Payer: COMMERCIAL

## 2022-05-26 DIAGNOSIS — E53.8 VITAMIN B12 DEFICIENCY: ICD-10-CM

## 2022-05-26 PROCEDURE — 96372 THER/PROPH/DIAG INJ SC/IM: CPT | Performed by: INTERNAL MEDICINE

## 2022-05-26 RX ORDER — DIPHENOXYLATE HYDROCHLORIDE AND ATROPINE SULFATE 2.5; .025 MG/1; MG/1
1 TABLET AS NEEDED TABLET ORAL
COMMUNITY
Start: 2022-03-24

## 2022-05-26 RX ORDER — BUDESONIDE 3 MG/1
3 CAPSULES CAPSULE, COATED PELLETS ORAL ONCE A DAY
Qty: 90 CAPSULE | Refills: 1 | COMMUNITY
Start: 2022-02-18

## 2022-05-26 RX ORDER — ADALIMUMAB 40MG/0.4ML
INJECT 40MG UNDER THE SKIN ONCE A WEEK KIT SUBCUTANEOUS
Qty: 6 PRE-FILLED PEN SYRINGE | Refills: 6 | COMMUNITY

## 2022-06-02 ENCOUNTER — OFFICE VISIT (OUTPATIENT)
Dept: URBAN - METROPOLITAN AREA CLINIC 84 | Facility: CLINIC | Age: 47
End: 2022-06-02
Payer: COMMERCIAL

## 2022-06-02 ENCOUNTER — LAB OUTSIDE AN ENCOUNTER (OUTPATIENT)
Dept: URBAN - METROPOLITAN AREA CLINIC 84 | Facility: CLINIC | Age: 47
End: 2022-06-02

## 2022-06-02 VITALS
HEART RATE: 64 BPM | SYSTOLIC BLOOD PRESSURE: 139 MMHG | WEIGHT: 210.6 LBS | DIASTOLIC BLOOD PRESSURE: 89 MMHG | HEIGHT: 69 IN | TEMPERATURE: 97.6 F | BODY MASS INDEX: 31.19 KG/M2

## 2022-06-02 DIAGNOSIS — K50.80 CROHN'S DISEASE OF BOTH SMALL AND LARGE INTESTINE WITHOUT COMPLICATION: ICD-10-CM

## 2022-06-02 DIAGNOSIS — Z79.899 ADALIMUMAB (HUMIRA) LONG-TERM USE: ICD-10-CM

## 2022-06-02 DIAGNOSIS — R19.7 DIARRHEA, UNSPECIFIED TYPE: ICD-10-CM

## 2022-06-02 DIAGNOSIS — E56.9 VITAMIN D DEFICIENCY: ICD-10-CM

## 2022-06-02 PROCEDURE — 99214 OFFICE O/P EST MOD 30 MIN: CPT | Performed by: INTERNAL MEDICINE

## 2022-06-02 RX ORDER — ADALIMUMAB 40MG/0.4ML
INJECT 40MG UNDER THE SKIN ONCE A WEEK KIT SUBCUTANEOUS
Qty: 6 PRE-FILLED PEN SYRINGE | Refills: 6 | COMMUNITY

## 2022-06-02 RX ORDER — BUDESONIDE 3 MG/1
3 CAPSULES CAPSULE, COATED PELLETS ORAL ONCE A DAY
Qty: 90 CAPSULE | Refills: 1 | COMMUNITY
Start: 2022-02-18

## 2022-06-02 RX ORDER — DIPHENOXYLATE HYDROCHLORIDE AND ATROPINE SULFATE 2.5; .025 MG/1; MG/1
1 TABLET AS NEEDED TABLET ORAL
COMMUNITY
Start: 2022-03-24

## 2022-06-02 NOTE — HPI-OTHER HISTORIES
March 2022 visit: Patient underwent a EGD and a colonoscopy in March 2022.  EGD revealed post fundoplication changes otherwise normal.  Colonoscopy revealed no visible endoscopic colitis throughout the colon but significant changes of remodeling from longstanding Crohn's disease present, right hemicolectomy with ileocolonic anastomosis that was patent but had ulceration, neoterminal ileum appeared normal, diverticulosis right distal to the anastomosis in the transverse colon, mildly stenotic appearing lumen in the sigmoid colon, descending colon and transverse colon, medium to large sized pedunculated and semipedunculated polyps present in parts of the colon that appeared to be benign. Gastric biopsy was benign, neoterminal ileum biopsies benign.  Biopsies from the anastomosis revealed chronic colitis with ulcer and granulation.  No endoscopic colitis seen in any biopsy.  All polyps were benign on biopsy. Stool test from February 22 revealed normal fecal calprotectin and also normal stool infection. Labs from December revealed normal B12, C-reactive protein, adequate Humira drug levels and low antibody levels.  Vitamin D 30, mild anemia with a hemoglobin of 10.  On budesonide. still having diarrhea. multiple BM per day. on weekly humira. on b 12 once a month.  Feb 2022 visit :   Labs from December 2021 adalimumab drug level 14 which is within therapeutic range, no antibodies present.  Normal B12 level.  Normal C-reactive protein, vitamin D level 30, normal CMP, anemia with a hemoglobin of 10, normal MCV 82.   Started having diarrhea , watery, scant blood, mild abdominal discomfort, no fever. on Humira weekly. was scheduled for colonoscopy but did not come in , now scheduled for march 2022.  November 2021 visit:    Elevated fecal calprotectin of 187 in September 2021.  Labs in August 2021 B12 430, normal C-reactive protein, low vitamin D 16, TB test negative, normal CMP, hemoglobin low 10.4  completed vit d treatment, now also using otc vit D. Weekly humira. Regular formed stools. on b 12 injections.  no heartburn.  no dysphagia.   July 2021 visit:   On Weekly Humira.Stopped 6MP because this this causes memory issues. She takes this prn when she thinks she get looser stools.  vit D def - didnt take meds since not covered by insurance. Also not recently taking B 12 injections. Did not have fecal calpro done as ordered. 1-2 BM per day. No rectal bleeding. Feels like Humira is working well.   Labs from May 2021 revealed normal vitamin B12 384, ferritin 88, C-reactive protein normal at 4, continued vitamin D deficiency 19.7, normal kidney function, normal liver enzymes, low Humira antibody 29, adequate Humira drug levels 12, slight anemia with a hemoglobin 10.3 otherwise normal CBC.  Fecal calprotectin was not done.  TB test was not done  April 2021:  Recovered from covid 19 without hospitalization in late 2020.   On weekly Humira. Stopped taking Imuran because she thinks this was causing memory loss.  Normal vitamin B12, low vitamin D at 16.9, TB test negative in June 2020, Humira drug levels 13, antibody levels low titer at 58, CRP 2. EGD August 2020 reveals a small amount of food in the gastric body, evidence of fundoplication, normal esophagus, no biopsies. 1 BM per day. No rectal bleeding. No abdominal pain. On monthly vit B 12 injection.  July 2020 : doing imuran 100 mg per day and weekly Humira. took meds for vit D def. on B 12 injections. feels CD is in better control. no abdominal pain. no diarrhea. occasional gerd. EGD in scheduled for next week. Colonoscopy in March 2020 revealed normal neoterminal ileum.  3 large pedunculated pseudopolyps in the transverse colon which were biopsied extensively.  Benign intrinsic appearing stenosis at approximately 60 cm, biopsied, large polypoid lesion at 55 cm also biopsied.  Relatively normal-appearing rectum, rectosigmoid, sigmoid colon and descending colon.  Extensive perianal skin tags.  Perianal skin discoloration.  Multiple semi-pedunculated polyps in the sigmoid colon.  Ulceration at the ileocolonic anastomosis. Biopsies from the anastomosis confirmed active colitis with mild activity.  Colonic stricture at 60 cm also revealed active colitis.  All other biopsies revealed benign mucosa. MR enterography February 2020 Limited exam because of motion artifact, short segment 4 cm thickening and mural enhancement of the terminal ileum.  Short segment of narrowing of the distal sigmoid and hepatic flexure without inflammatory changes.  Long segment thickening of the descending colon.  Images were not obtained to the level of the rectum and anus to evaluate the suspected left perianal and perirectal fistula seen on prior CT. Jan 2020 -  missed humira dose for abt 4 weeks. took it this week on monday. ran out of imuran last week. bowel freq 2-3 times per day. occasional dysphagia.  Dec  2019 -  Was having trouble getting meds from pharmacy, missed humira for 3 weeks. took humira yesterday. remains  on imuran 100 mg daily. Having more diarrhea now than usual. no rectal bleeding. was feeling much better when was regular on humira and imuran. now experiecing flareup symptoms. no fever / abdominal pain.  Labs Nov 2019 : CR 0.66, T BILI <0.2, ALP 60, AST 22, ALT 13, WBC 4.3, HB (10.5), , lipase 41, CRP 1. Oct 2019 -  aug labs reviewed. normal wbc count. Hb 11. normal LFTs. normal amylase and lipase. no diarrhea. no abdominal pain. no rectal bleeding. compliant to meds.  Aug 2019 -  s/p repair of hiatal hernia + fundoplication. stopped meds on her own during periop period ( stopped in early june - restarted in late july). but continued imuran. now back on humira every week and imuran.  normal cbc. normal kidney and liver function. no abdominal pain. occasional diarrhea. no fever. no rectal bleeding.  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 Has bowel movements 1 to 2 times a day, no anal bleeding, no abdominal pain. Presently on humira and imuran and reports some relief of symptoms. Patient is scheduled to see thoracic surgeon and will likely stop the medicine one to two weeks prior to surgery as directed by thoracic surgeon. April 2019 -  CT enterography march 2019  -normal small bowel. suspected perianal / perirectal fistula. very large hiatal hernia containing most of the stomach / part of pancreas and spleen. heterogenous uterus.  Humira dose increased to weekly. Also added imuran. labs march 2019 - low vit D. normal B 12. normal lfts. low ferritin. Hb 9. Fecal calprotectin 106.  Bowels are more formed. feeling better. got injectafor 1 shot. getting 1 more dose next week.  March 2019 -  Colonoscopy in feb 2019 revealed post op anatomy, likely ileocolonic anastomosis in TC. patchy pseudopolyps in entire colon. TC diverticulosis. perianal fistula and skin tags. mucosal bridge in sigmoid colon. Moderately active inflammation was present in ileocolonic anastomosis Bx but remainder colon biopsy was normal. TPMT activity - normal. Slightly lower Humira drug levels ( 7.8).  Humira antibody 196 ( intermediate antibody level) Labs in dec 2018 - Hb 10.4, low b 12 166, normal CMP.  TB neg. Hep B neg. CRP elevated to 18.  low Vit D 10. Continues to have diarrhea. ALso describes abdominal discomfort. Pt feels crohns is not well controlled.  DEC 2018 - Patient was last seen in May 2018 by Dr. Rodriges. On Humira. Last labs from April 2018: hgb 10, normal LFT's, normal renal function.  EGD Nov 2017 : large hiatal hernia, Schatzki's ring. Has previously been on Remicade - but stopped working in Dec 2017. Colonoscopy June 2017 : inflammed colon, ulcerated iliocolonic anastamosis, normal neoti pseudopolyps in the colon noted. Path: confirmed inflammation from ileal biopsy, colonic biopsy did not reveal any active crohn's disease. CD was diagnosed at age 11. On Humira in early 2018. Feels like its working. Bowel freq 5-6 times per day, loose stools. No FH of colon cancer. noticing hypopigmentation on skin. s/p right hemicolectomy in 1989. Perianal fistula not draining as bad as before.

## 2022-06-30 ENCOUNTER — OFFICE VISIT (OUTPATIENT)
Dept: URBAN - METROPOLITAN AREA CLINIC 83 | Facility: CLINIC | Age: 47
End: 2022-06-30

## 2022-10-19 ENCOUNTER — ERX REFILL RESPONSE (OUTPATIENT)
Dept: URBAN - METROPOLITAN AREA CLINIC 25 | Facility: CLINIC | Age: 47
End: 2022-10-19

## 2022-10-19 RX ORDER — ADALIMUMAB 40MG/0.4ML
INJECT 40MG UNDER THE SKIN ONCE A WEEK KIT SUBCUTANEOUS
Qty: 6 PRE-FILLED PEN SYRINGE | Refills: 6 | OUTPATIENT

## 2022-10-19 RX ORDER — ADALIMUMAB 40MG/0.4ML
INJECT 40MG UNDER THE SKIN ONCE A WEEK KIT SUBCUTANEOUS
Qty: 4 | Refills: 6 | OUTPATIENT

## 2023-01-03 ENCOUNTER — TELEPHONE ENCOUNTER (OUTPATIENT)
Dept: URBAN - METROPOLITAN AREA CLINIC 84 | Facility: CLINIC | Age: 48
End: 2023-01-03

## 2023-02-16 ENCOUNTER — OFFICE VISIT (OUTPATIENT)
Dept: URBAN - METROPOLITAN AREA CLINIC 84 | Facility: CLINIC | Age: 48
End: 2023-02-16
Payer: COMMERCIAL

## 2023-02-16 VITALS
BODY MASS INDEX: 31.99 KG/M2 | TEMPERATURE: 97.4 F | WEIGHT: 216 LBS | DIASTOLIC BLOOD PRESSURE: 80 MMHG | HEIGHT: 69 IN | SYSTOLIC BLOOD PRESSURE: 147 MMHG | HEART RATE: 97 BPM

## 2023-02-16 DIAGNOSIS — E53.8 VITAMIN B 12 DEFICIENCY: ICD-10-CM

## 2023-02-16 DIAGNOSIS — K50.80 CROHN'S DISEASE OF BOTH SMALL AND LARGE INTESTINE WITHOUT COMPLICATION: ICD-10-CM

## 2023-02-16 DIAGNOSIS — R19.7 DIARRHEA, UNSPECIFIED TYPE: ICD-10-CM

## 2023-02-16 PROBLEM — 34713006 VITAMIN D DEFICIENCY: Status: ACTIVE | Noted: 2020-07-30

## 2023-02-16 PROBLEM — 710814002 LONG-TERM CURRENT USE OF DRUG THERAPY: Status: ACTIVE | Noted: 2020-07-30

## 2023-02-16 PROBLEM — 39839004 DIAPHRAGMATIC HERNIA: Status: ACTIVE | Noted: 2020-07-30

## 2023-02-16 PROBLEM — 64117007 VITAMIN B12 DEFICIENCY (NON ANEMIC): Status: ACTIVE | Noted: 2020-07-30

## 2023-02-16 PROCEDURE — 99214 OFFICE O/P EST MOD 30 MIN: CPT | Performed by: INTERNAL MEDICINE

## 2023-02-16 RX ORDER — DIPHENOXYLATE HYDROCHLORIDE AND ATROPINE SULFATE 2.5; .025 MG/1; MG/1
1 TABLET AS NEEDED TABLET ORAL
Status: ON HOLD | COMMUNITY
Start: 2022-03-24

## 2023-02-16 RX ORDER — ADALIMUMAB 40MG/0.4ML
INJECT 40MG UNDER THE SKIN ONCE A WEEK KIT SUBCUTANEOUS
Qty: 4 | Refills: 6 | Status: DISCONTINUED | COMMUNITY

## 2023-02-16 RX ORDER — BUDESONIDE 3 MG/1
3 CAPSULES CAPSULE, COATED PELLETS ORAL ONCE A DAY
Qty: 90 CAPSULE | Refills: 1 | Status: ON HOLD | COMMUNITY
Start: 2022-02-18

## 2023-02-16 NOTE — HPI-OTHER HISTORIES
June 2022: Using lomotil prn. On Humira weekly. diarrhea is better. no abdominal pain.  March 2022 visit: Patient underwent a EGD and a colonoscopy in March 2022.  EGD revealed post fundoplication changes otherwise normal.  Colonoscopy revealed no visible endoscopic colitis throughout the colon but significant changes of remodeling from longstanding Crohn's disease present, right hemicolectomy with ileocolonic anastomosis that was patent but had ulceration, neoterminal ileum appeared normal, diverticulosis right distal to the anastomosis in the transverse colon, mildly stenotic appearing lumen in the sigmoid colon, descending colon and transverse colon, medium to large sized pedunculated and semipedunculated polyps present in parts of the colon that appeared to be benign. Gastric biopsy was benign, neoterminal ileum biopsies benign.  Biopsies from the anastomosis revealed chronic colitis with ulcer and granulation.  No endoscopic colitis seen in any biopsy.  All polyps were benign on biopsy. Stool test from February 22 revealed normal fecal calprotectin and also normal stool infection. Labs from December revealed normal B12, C-reactive protein, adequate Humira drug levels and low antibody levels.  Vitamin D 30, mild anemia with a hemoglobin of 10.  On budesonide. still having diarrhea. multiple BM per day. on weekly humira. on b 12 once a month.  Feb 2022 visit :   Labs from December 2021 adalimumab drug level 14 which is within therapeutic range, no antibodies present.  Normal B12 level.  Normal C-reactive protein, vitamin D level 30, normal CMP, anemia with a hemoglobin of 10, normal MCV 82.   Started having diarrhea , watery, scant blood, mild abdominal discomfort, no fever. on Humira weekly. was scheduled for colonoscopy but did not come in , now scheduled for march 2022.  November 2021 visit:    Elevated fecal calprotectin of 187 in September 2021.  Labs in August 2021 B12 430, normal C-reactive protein, low vitamin D 16, TB test negative, normal CMP, hemoglobin low 10.4  completed vit d treatment, now also using otc vit D. Weekly humira. Regular formed stools. on b 12 injections.  no heartburn.  no dysphagia.   July 2021 visit:   On Weekly Humira.Stopped 6MP because this this causes memory issues. She takes this prn when she thinks she get looser stools.  vit D def - didnt take meds since not covered by insurance. Also not recently taking B 12 injections. Did not have fecal calpro done as ordered. 1-2 BM per day. No rectal bleeding. Feels like Humira is working well.   Labs from May 2021 revealed normal vitamin B12 384, ferritin 88, C-reactive protein normal at 4, continued vitamin D deficiency 19.7, normal kidney function, normal liver enzymes, low Humira antibody 29, adequate Humira drug levels 12, slight anemia with a hemoglobin 10.3 otherwise normal CBC.  Fecal calprotectin was not done.  TB test was not done  April 2021:  Recovered from covid 19 without hospitalization in late 2020.   On weekly Humira. Stopped taking Imuran because she thinks this was causing memory loss.  Normal vitamin B12, low vitamin D at 16.9, TB test negative in June 2020, Humira drug levels 13, antibody levels low titer at 58, CRP 2. EGD August 2020 reveals a small amount of food in the gastric body, evidence of fundoplication, normal esophagus, no biopsies. 1 BM per day. No rectal bleeding. No abdominal pain. On monthly vit B 12 injection.  July 2020 : doing imuran 100 mg per day and weekly Humira. took meds for vit D def. on B 12 injections. feels CD is in better control. no abdominal pain. no diarrhea. occasional gerd. EGD in scheduled for next week. Colonoscopy in March 2020 revealed normal neoterminal ileum.  3 large pedunculated pseudopolyps in the transverse colon which were biopsied extensively.  Benign intrinsic appearing stenosis at approximately 60 cm, biopsied, large polypoid lesion at 55 cm also biopsied.  Relatively normal-appearing rectum, rectosigmoid, sigmoid colon and descending colon.  Extensive perianal skin tags.  Perianal skin discoloration.  Multiple semi-pedunculated polyps in the sigmoid colon.  Ulceration at the ileocolonic anastomosis. Biopsies from the anastomosis confirmed active colitis with mild activity.  Colonic stricture at 60 cm also revealed active colitis.  All other biopsies revealed benign mucosa. MR enterography February 2020 Limited exam because of motion artifact, short segment 4 cm thickening and mural enhancement of the terminal ileum.  Short segment of narrowing of the distal sigmoid and hepatic flexure without inflammatory changes.  Long segment thickening of the descending colon.  Images were not obtained to the level of the rectum and anus to evaluate the suspected left perianal and perirectal fistula seen on prior CT. Jan 2020 -  missed humira dose for abt 4 weeks. took it this week on monday. ran out of imuran last week. bowel freq 2-3 times per day. occasional dysphagia.  Dec  2019 -  Was having trouble getting meds from pharmacy, missed humira for 3 weeks. took humira yesterday. remains  on imuran 100 mg daily. Having more diarrhea now than usual. no rectal bleeding. was feeling much better when was regular on humira and imuran. now experiecing flareup symptoms. no fever / abdominal pain.  Labs Nov 2019 : CR 0.66, T BILI <0.2, ALP 60, AST 22, ALT 13, WBC 4.3, HB (10.5), , lipase 41, CRP 1. Oct 2019 -  aug labs reviewed. normal wbc count. Hb 11. normal LFTs. normal amylase and lipase. no diarrhea. no abdominal pain. no rectal bleeding. compliant to meds.  Aug 2019 -  s/p repair of hiatal hernia + fundoplication. stopped meds on her own during periop period ( stopped in early june - restarted in late july). but continued imuran. now back on humira every week and imuran.  normal cbc. normal kidney and liver function. no abdominal pain. occasional diarrhea. no fever. no rectal bleeding.  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 Has bowel movements 1 to 2 times a day, no anal bleeding, no abdominal pain. Presently on humira and imuran and reports some relief of symptoms. Patient is scheduled to see thoracic surgeon and will likely stop the medicine one to two weeks prior to surgery as directed by thoracic surgeon. April 2019 -  CT enterography march 2019  -normal small bowel. suspected perianal / perirectal fistula. very large hiatal hernia containing most of the stomach / part of pancreas and spleen. heterogenous uterus.  Humira dose increased to weekly. Also added imuran. labs march 2019 - low vit D. normal B 12. normal lfts. low ferritin. Hb 9. Fecal calprotectin 106.  Bowels are more formed. feeling better. got injectafor 1 shot. getting 1 more dose next week.  March 2019 -  Colonoscopy in feb 2019 revealed post op anatomy, likely ileocolonic anastomosis in TC. patchy pseudopolyps in entire colon. TC diverticulosis. perianal fistula and skin tags. mucosal bridge in sigmoid colon. Moderately active inflammation was present in ileocolonic anastomosis Bx but remainder colon biopsy was normal. TPMT activity - normal. Slightly lower Humira drug levels ( 7.8).  Humira antibody 196 ( intermediate antibody level) Labs in dec 2018 - Hb 10.4, low b 12 166, normal CMP.  TB neg. Hep B neg. CRP elevated to 18.  low Vit D 10. Continues to have diarrhea. ALso describes abdominal discomfort. Pt feels crohns is not well controlled.  DEC 2018 - Patient was last seen in May 2018 by Dr. Rodriges. On Humira. Last labs from April 2018: hgb 10, normal LFT's, normal renal function.  EGD Nov 2017 : large hiatal hernia, Schatzki's ring. Has previously been on Remicade - but stopped working in Dec 2017. Colonoscopy June 2017 : inflammed colon, ulcerated iliocolonic anastamosis, normal neoti pseudopolyps in the colon noted. Path: confirmed inflammation from ileal biopsy, colonic biopsy did not reveal any active crohn's disease. CD was diagnosed at age 11. On Humira in early 2018. Feels like its working. Bowel freq 5-6 times per day, loose stools. No FH of colon cancer. noticing hypopigmentation on skin. s/p right hemicolectomy in 1989. Perianal fistula not draining as bad as before.

## 2023-02-16 NOTE — HPI-TODAY'S VISIT:
feb 2023 visit: doing humira every week. 2-3 BM per day. feels like humira alone isnt working well for her. no rectal bleeding. mild left lower abdominal discomfort on - off. she feels humira causes diarrhea to get worse. she says unable to get MRI entero because she has poor iv access. took budesonide from her older prescription which she said helped with diarrhea. she took it on her own whole of jan 2023. we ordered labs and MRI at her last visit in june 2022 but she never did these.

## 2023-03-30 ENCOUNTER — LAB OUTSIDE AN ENCOUNTER (OUTPATIENT)
Dept: URBAN - METROPOLITAN AREA CLINIC 25 | Facility: CLINIC | Age: 48
End: 2023-03-30

## 2023-04-04 LAB
% SATURATION: 12
A/G RATIO: 0.9
ALBUMIN: 4
ALKALINE PHOSPHATASE: 57
ALT (SGPT): 13
AST (SGOT): 19
BILIRUBIN, TOTAL: 0.3
BUN/CREATININE RATIO: (no result)
BUN: 10
C-REACTIVE PROTEIN, QUANT: 4
CALCIUM: 9.9
CARBON DIOXIDE, TOTAL: 24
CHLORIDE: 107
CREATININE: 0.72
EGFR: 103
FERRITIN: 34
GLOBULIN, TOTAL: 4.6
GLUCOSE: 84
HEMATOCRIT: 33.7
HEMOGLOBIN: 11.3
HEPATITIS B SURFACE AB IMMUNITY, QN: 690
HEPATITIS B SURFACE ANTIGEN: (no result)
IRON BINDING CAPACITY: 390
IRON, TOTAL: 48
MCH: 27.2
MCHC: 33.5
MCV: 81
MITOGEN-NIL: <0
MPV: 11.3
PLATELET COUNT: 314
POTASSIUM: 3.7
PROTEIN, TOTAL: 8.6
QUANTIFERON NIL VALUE: >8
QUANTIFERON TB1 AG VALUE: <0
QUANTIFERON TB2 AG VALUE: <0
QUANTIFERON-TB GOLD PLUS: (no result)
RDW: 15.2
RED BLOOD CELL COUNT: 4.16
SODIUM: 140
VITAMIN B12: 390
VITAMIN D,25-OH,TOTAL,IA: 28
WHITE BLOOD CELL COUNT: 5.8

## 2023-04-05 ENCOUNTER — TELEPHONE ENCOUNTER (OUTPATIENT)
Dept: URBAN - METROPOLITAN AREA CLINIC 25 | Facility: CLINIC | Age: 48
End: 2023-04-05

## 2023-04-05 ENCOUNTER — LAB OUTSIDE AN ENCOUNTER (OUTPATIENT)
Dept: URBAN - METROPOLITAN AREA CLINIC 25 | Facility: CLINIC | Age: 48
End: 2023-04-05

## 2023-04-05 PROBLEM — 440662009: Status: ACTIVE | Noted: 2023-04-05

## 2023-04-05 RX ORDER — ERGOCALCIFEROL 1.25 MG/1
1 CAPSULE CAPSULE, LIQUID FILLED ORAL
Qty: 8 CAPSULE | Refills: 0 | OUTPATIENT
Start: 2023-04-05 | End: 2023-04-13

## 2023-04-14 ENCOUNTER — CLAIMS CREATED FROM THE CLAIM WINDOW (OUTPATIENT)
Dept: URBAN - METROPOLITAN AREA CLINIC 83 | Facility: CLINIC | Age: 48
End: 2023-04-14

## 2023-04-14 ENCOUNTER — OFFICE VISIT (OUTPATIENT)
Dept: URBAN - METROPOLITAN AREA CLINIC 83 | Facility: CLINIC | Age: 48
End: 2023-04-14

## 2023-04-14 LAB — CALPROTECTIN, FECAL: 37

## 2023-05-19 ENCOUNTER — OFFICE VISIT (OUTPATIENT)
Dept: URBAN - METROPOLITAN AREA CLINIC 83 | Facility: CLINIC | Age: 48
End: 2023-05-19
Payer: COMMERCIAL

## 2023-05-19 DIAGNOSIS — E53.8 VITAMIN B12 DEFICIENCY: ICD-10-CM

## 2023-05-19 PROCEDURE — 96372 THER/PROPH/DIAG INJ SC/IM: CPT | Performed by: INTERNAL MEDICINE

## 2023-05-19 RX ORDER — BUDESONIDE 3 MG/1
3 CAPSULES CAPSULE, COATED PELLETS ORAL ONCE A DAY
Qty: 90 CAPSULE | Refills: 1 | Status: ON HOLD | COMMUNITY
Start: 2022-02-18

## 2023-05-19 RX ORDER — DIPHENOXYLATE HYDROCHLORIDE AND ATROPINE SULFATE 2.5; .025 MG/1; MG/1
1 TABLET AS NEEDED TABLET ORAL
Status: ON HOLD | COMMUNITY
Start: 2022-03-24

## 2023-05-25 ENCOUNTER — OFFICE VISIT (OUTPATIENT)
Dept: URBAN - METROPOLITAN AREA CLINIC 84 | Facility: CLINIC | Age: 48
End: 2023-05-25

## 2023-05-26 LAB
MITOGEN-NIL: >10
QUANTIFERON NIL VALUE: 0.13
QUANTIFERON TB1 AG VALUE: <0
QUANTIFERON TB2 AG VALUE: <0
QUANTIFERON-TB GOLD PLUS: NEGATIVE

## 2023-06-15 ENCOUNTER — LAB OUTSIDE AN ENCOUNTER (OUTPATIENT)
Dept: URBAN - METROPOLITAN AREA CLINIC 84 | Facility: CLINIC | Age: 48
End: 2023-06-15

## 2023-06-15 ENCOUNTER — OFFICE VISIT (OUTPATIENT)
Dept: URBAN - METROPOLITAN AREA CLINIC 84 | Facility: CLINIC | Age: 48
End: 2023-06-15
Payer: COMMERCIAL

## 2023-06-15 ENCOUNTER — WEB ENCOUNTER (OUTPATIENT)
Dept: URBAN - METROPOLITAN AREA CLINIC 84 | Facility: CLINIC | Age: 48
End: 2023-06-15

## 2023-06-15 VITALS
TEMPERATURE: 97.8 F | DIASTOLIC BLOOD PRESSURE: 78 MMHG | BODY MASS INDEX: 31.73 KG/M2 | HEART RATE: 91 BPM | WEIGHT: 214.2 LBS | HEIGHT: 69 IN | SYSTOLIC BLOOD PRESSURE: 139 MMHG

## 2023-06-15 DIAGNOSIS — E53.8 VITAMIN B 12 DEFICIENCY: ICD-10-CM

## 2023-06-15 DIAGNOSIS — R19.7 DIARRHEA, UNSPECIFIED TYPE: ICD-10-CM

## 2023-06-15 DIAGNOSIS — K50.80 CROHN'S DISEASE OF BOTH SMALL AND LARGE INTESTINE WITHOUT COMPLICATION: ICD-10-CM

## 2023-06-15 DIAGNOSIS — E56.9 VITAMIN D DEFICIENCY: ICD-10-CM

## 2023-06-15 PROCEDURE — 99214 OFFICE O/P EST MOD 30 MIN: CPT | Performed by: INTERNAL MEDICINE

## 2023-06-15 RX ORDER — BUDESONIDE 3 MG/1
3 CAPSULES CAPSULE, COATED PELLETS ORAL ONCE A DAY
Qty: 90 CAPSULE | Refills: 1 | COMMUNITY
Start: 2022-02-18

## 2023-06-15 RX ORDER — POLYETHYLENE GLYCOL 3350, SODIUM SULFATE ANHYDROUS, SODIUM BICARBONATE, SODIUM CHLORIDE, POTASSIUM CHLORIDE 236; 22.74; 6.74; 5.86; 2.97 G/4L; G/4L; G/4L; G/4L; G/4L
AS DIRECTED POWDER, FOR SOLUTION ORAL ONCE
Qty: 1 | Refills: 0 | OUTPATIENT
Start: 2023-06-15 | End: 2023-06-16

## 2023-06-15 RX ORDER — DIPHENOXYLATE HYDROCHLORIDE AND ATROPINE SULFATE 2.5; .025 MG/1; MG/1
1 TABLET AS NEEDED TABLET ORAL
COMMUNITY
Start: 2022-03-24

## 2023-06-15 NOTE — HPI-TODAY'S VISIT:
June 2023 visit: Most recent labs from April and May 2023 revealed negative TB QuantiFERON, low normal B12 390, normal C-reactive protein, low vitamin D 28, low iron saturation 12, ferritin 34, mild anemia with a hemoglobin of 11.3, hepatitis B surface antigen negative, hepatitis B immune.  Normal fecal calprotectin at 37.  Prior to that a TB QuantiFERON was indeterminate in April 2023. completed vit D high dose for 8 weeks. also taking daily vit d supplement. also doing vit b 12 monthly.  not on oral iron.  has seen heme previously and did receive iv iron. cant tolerate oral iron. Bowel consistency varies with what she eats. diarrhea worse with eating vegetables / salads. no rectal bleeding. taking humira weekly. she has seen derm ( dr cummings ) this yr.

## 2023-06-15 NOTE — HPI-OTHER HISTORIES
feb 2023 visit: doing humira every week. 2-3 BM per day. feels like humira alone isnt working well for her. no rectal bleeding. mild left lower abdominal discomfort on - off. she feels humira causes diarrhea to get worse. she says unable to get MRI entero because she has poor iv access. took budesonide from her older prescription which she said helped with diarrhea. she took it on her own whole of jan 2023. we ordered labs and MRI at her last visit in june 2022 but she never did these.  June 2022: Using lomotil prn. On Humira weekly. diarrhea is better. no abdominal pain.  March 2022 visit: Patient underwent a EGD and a colonoscopy in March 2022.  EGD revealed post fundoplication changes otherwise normal.  Colonoscopy revealed no visible endoscopic colitis throughout the colon but significant changes of remodeling from longstanding Crohn's disease present, right hemicolectomy with ileocolonic anastomosis that was patent but had ulceration, neoterminal ileum appeared normal, diverticulosis right distal to the anastomosis in the transverse colon, mildly stenotic appearing lumen in the sigmoid colon, descending colon and transverse colon, medium to large sized pedunculated and semipedunculated polyps present in parts of the colon that appeared to be benign. Gastric biopsy was benign, neoterminal ileum biopsies benign.  Biopsies from the anastomosis revealed chronic colitis with ulcer and granulation.  No endoscopic colitis seen in any biopsy.  All polyps were benign on biopsy. Stool test from February 22 revealed normal fecal calprotectin and also normal stool infection. Labs from December revealed normal B12, C-reactive protein, adequate Humira drug levels and low antibody levels.  Vitamin D 30, mild anemia with a hemoglobin of 10.  On budesonide. still having diarrhea. multiple BM per day. on weekly humira. on b 12 once a month.  Feb 2022 visit :   Labs from December 2021 adalimumab drug level 14 which is within therapeutic range, no antibodies present.  Normal B12 level.  Normal C-reactive protein, vitamin D level 30, normal CMP, anemia with a hemoglobin of 10, normal MCV 82.   Started having diarrhea , watery, scant blood, mild abdominal discomfort, no fever. on Humira weekly. was scheduled for colonoscopy but did not come in , now scheduled for march 2022.  November 2021 visit:    Elevated fecal calprotectin of 187 in September 2021.  Labs in August 2021 B12 430, normal C-reactive protein, low vitamin D 16, TB test negative, normal CMP, hemoglobin low 10.4  completed vit d treatment, now also using otc vit D. Weekly humira. Regular formed stools. on b 12 injections.  no heartburn.  no dysphagia.   July 2021 visit:   On Weekly Humira.Stopped 6MP because this this causes memory issues. She takes this prn when she thinks she get looser stools.  vit D def - didnt take meds since not covered by insurance. Also not recently taking B 12 injections. Did not have fecal calpro done as ordered. 1-2 BM per day. No rectal bleeding. Feels like Humira is working well.   Labs from May 2021 revealed normal vitamin B12 384, ferritin 88, C-reactive protein normal at 4, continued vitamin D deficiency 19.7, normal kidney function, normal liver enzymes, low Humira antibody 29, adequate Humira drug levels 12, slight anemia with a hemoglobin 10.3 otherwise normal CBC.  Fecal calprotectin was not done.  TB test was not done  April 2021:  Recovered from covid 19 without hospitalization in late 2020.   On weekly Humira. Stopped taking Imuran because she thinks this was causing memory loss.  Normal vitamin B12, low vitamin D at 16.9, TB test negative in June 2020, Humira drug levels 13, antibody levels low titer at 58, CRP 2. EGD August 2020 reveals a small amount of food in the gastric body, evidence of fundoplication, normal esophagus, no biopsies. 1 BM per day. No rectal bleeding. No abdominal pain. On monthly vit B 12 injection.  July 2020 : doing imuran 100 mg per day and weekly Humira. took meds for vit D def. on B 12 injections. feels CD is in better control. no abdominal pain. no diarrhea. occasional gerd. EGD in scheduled for next week. Colonoscopy in March 2020 revealed normal neoterminal ileum.  3 large pedunculated pseudopolyps in the transverse colon which were biopsied extensively.  Benign intrinsic appearing stenosis at approximately 60 cm, biopsied, large polypoid lesion at 55 cm also biopsied.  Relatively normal-appearing rectum, rectosigmoid, sigmoid colon and descending colon.  Extensive perianal skin tags.  Perianal skin discoloration.  Multiple semi-pedunculated polyps in the sigmoid colon.  Ulceration at the ileocolonic anastomosis. Biopsies from the anastomosis confirmed active colitis with mild activity.  Colonic stricture at 60 cm also revealed active colitis.  All other biopsies revealed benign mucosa. MR enterography February 2020 Limited exam because of motion artifact, short segment 4 cm thickening and mural enhancement of the terminal ileum.  Short segment of narrowing of the distal sigmoid and hepatic flexure without inflammatory changes.  Long segment thickening of the descending colon.  Images were not obtained to the level of the rectum and anus to evaluate the suspected left perianal and perirectal fistula seen on prior CT. Jan 2020 -  missed humira dose for abt 4 weeks. took it this week on monday. ran out of imuran last week. bowel freq 2-3 times per day. occasional dysphagia.  Dec  2019 -  Was having trouble getting meds from pharmacy, missed humira for 3 weeks. took humira yesterday. remains  on imuran 100 mg daily. Having more diarrhea now than usual. no rectal bleeding. was feeling much better when was regular on humira and imuran. now experiecing flareup symptoms. no fever / abdominal pain.  Labs Nov 2019 : CR 0.66, T BILI <0.2, ALP 60, AST 22, ALT 13, WBC 4.3, HB (10.5), , lipase 41, CRP 1. Oct 2019 -  aug labs reviewed. normal wbc count. Hb 11. normal LFTs. normal amylase and lipase. no diarrhea. no abdominal pain. no rectal bleeding. compliant to meds.  Aug 2019 -  s/p repair of hiatal hernia + fundoplication. stopped meds on her own during periop period ( stopped in early june - restarted in late july). but continued imuran. now back on humira every week and imuran.  normal cbc. normal kidney and liver function. no abdominal pain. occasional diarrhea. no fever. no rectal bleeding.  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 Has bowel movements 1 to 2 times a day, no anal bleeding, no abdominal pain. Presently on humira and imuran and reports some relief of symptoms. Patient is scheduled to see thoracic surgeon and will likely stop the medicine one to two weeks prior to surgery as directed by thoracic surgeon. April 2019 -  CT enterography march 2019  -normal small bowel. suspected perianal / perirectal fistula. very large hiatal hernia containing most of the stomach / part of pancreas and spleen. heterogenous uterus.  Humira dose increased to weekly. Also added imuran. labs march 2019 - low vit D. normal B 12. normal lfts. low ferritin. Hb 9. Fecal calprotectin 106.  Bowels are more formed. feeling better. got injectafor 1 shot. getting 1 more dose next week.  March 2019 -  Colonoscopy in feb 2019 revealed post op anatomy, likely ileocolonic anastomosis in TC. patchy pseudopolyps in entire colon. TC diverticulosis. perianal fistula and skin tags. mucosal bridge in sigmoid colon. Moderately active inflammation was present in ileocolonic anastomosis Bx but remainder colon biopsy was normal. TPMT activity - normal. Slightly lower Humira drug levels ( 7.8).  Humira antibody 196 ( intermediate antibody level) Labs in dec 2018 - Hb 10.4, low b 12 166, normal CMP.  TB neg. Hep B neg. CRP elevated to 18.  low Vit D 10. Continues to have diarrhea. ALso describes abdominal discomfort. Pt feels crohns is not well controlled.  DEC 2018 - Patient was last seen in May 2018 by Dr. Rodriges. On Humira. Last labs from April 2018: hgb 10, normal LFT's, normal renal function.  EGD Nov 2017 : large hiatal hernia, Schatzki's ring. Has previously been on Remicade - but stopped working in Dec 2017. Colonoscopy June 2017 : inflammed colon, ulcerated iliocolonic anastamosis, normal neoti pseudopolyps in the colon noted. Path: confirmed inflammation from ileal biopsy, colonic biopsy did not reveal any active crohn's disease. CD was diagnosed at age 11. On Humira in early 2018. Feels like its working. Bowel freq 5-6 times per day, loose stools. No FH of colon cancer. noticing hypopigmentation on skin. s/p right hemicolectomy in 1989. Perianal fistula not draining as bad as before.

## 2023-06-16 ENCOUNTER — OFFICE VISIT (OUTPATIENT)
Dept: URBAN - METROPOLITAN AREA CLINIC 83 | Facility: CLINIC | Age: 48
End: 2023-06-16

## 2023-06-23 ENCOUNTER — OFFICE VISIT (OUTPATIENT)
Dept: URBAN - METROPOLITAN AREA CLINIC 83 | Facility: CLINIC | Age: 48
End: 2023-06-23
Payer: COMMERCIAL

## 2023-06-23 DIAGNOSIS — E53.8 VITAMIN B12 DEFICIENCY: ICD-10-CM

## 2023-06-23 PROCEDURE — 96372 THER/PROPH/DIAG INJ SC/IM: CPT | Performed by: INTERNAL MEDICINE

## 2023-06-23 RX ORDER — BUDESONIDE 3 MG/1
3 CAPSULES CAPSULE, COATED PELLETS ORAL ONCE A DAY
Qty: 90 CAPSULE | Refills: 1 | COMMUNITY
Start: 2022-02-18

## 2023-06-23 RX ORDER — DIPHENOXYLATE HYDROCHLORIDE AND ATROPINE SULFATE 2.5; .025 MG/1; MG/1
1 TABLET AS NEEDED TABLET ORAL
COMMUNITY
Start: 2022-03-24

## 2023-07-14 ENCOUNTER — TELEPHONE ENCOUNTER (OUTPATIENT)
Dept: URBAN - METROPOLITAN AREA CLINIC 84 | Facility: CLINIC | Age: 48
End: 2023-07-14

## 2023-07-18 ENCOUNTER — CLAIMS CREATED FROM THE CLAIM WINDOW (OUTPATIENT)
Dept: URBAN - METROPOLITAN AREA CLINIC 4 | Facility: CLINIC | Age: 48
End: 2023-07-18
Payer: COMMERCIAL

## 2023-07-18 ENCOUNTER — OFFICE VISIT (OUTPATIENT)
Dept: URBAN - METROPOLITAN AREA SURGERY CENTER 20 | Facility: SURGERY CENTER | Age: 48
End: 2023-07-18
Payer: COMMERCIAL

## 2023-07-18 DIAGNOSIS — K63.5 BENIGN COLON POLYP: ICD-10-CM

## 2023-07-18 DIAGNOSIS — K63.89 OTHER SPECIFIED DISEASES OF INTESTINE: ICD-10-CM

## 2023-07-18 DIAGNOSIS — K63.3 ULCER OF INTESTINE: ICD-10-CM

## 2023-07-18 DIAGNOSIS — K50.80 CROHN'S COLITIS: ICD-10-CM

## 2023-07-18 PROCEDURE — 88305 TISSUE EXAM BY PATHOLOGIST: CPT | Performed by: PATHOLOGY

## 2023-07-18 PROCEDURE — 45380 COLONOSCOPY AND BIOPSY: CPT | Performed by: INTERNAL MEDICINE

## 2023-07-18 PROCEDURE — 88341 IMHCHEM/IMCYTCHM EA ADD ANTB: CPT | Performed by: PATHOLOGY

## 2023-07-18 PROCEDURE — G8907 PT DOC NO EVENTS ON DISCHARG: HCPCS | Performed by: INTERNAL MEDICINE

## 2023-07-18 PROCEDURE — 88342 IMHCHEM/IMCYTCHM 1ST ANTB: CPT | Performed by: PATHOLOGY

## 2023-07-18 RX ORDER — BUDESONIDE 3 MG/1
3 CAPSULES CAPSULE, COATED PELLETS ORAL ONCE A DAY
Qty: 90 CAPSULE | Refills: 1 | COMMUNITY
Start: 2022-02-18

## 2023-07-18 RX ORDER — DIPHENOXYLATE HYDROCHLORIDE AND ATROPINE SULFATE 2.5; .025 MG/1; MG/1
1 TABLET AS NEEDED TABLET ORAL
COMMUNITY
Start: 2022-03-24

## 2023-07-31 ENCOUNTER — TELEPHONE ENCOUNTER (OUTPATIENT)
Dept: URBAN - METROPOLITAN AREA CLINIC 25 | Facility: CLINIC | Age: 48
End: 2023-07-31

## 2023-10-19 ENCOUNTER — OFFICE VISIT (OUTPATIENT)
Dept: URBAN - METROPOLITAN AREA CLINIC 84 | Facility: CLINIC | Age: 48
End: 2023-10-19

## 2023-10-19 RX ORDER — DIPHENOXYLATE HYDROCHLORIDE AND ATROPINE SULFATE 2.5; .025 MG/1; MG/1
1 TABLET AS NEEDED TABLET ORAL
COMMUNITY
Start: 2022-03-24

## 2023-10-19 RX ORDER — BUDESONIDE 3 MG/1
3 CAPSULES CAPSULE, COATED PELLETS ORAL ONCE A DAY
Qty: 90 CAPSULE | Refills: 1 | COMMUNITY
Start: 2022-02-18

## 2023-10-19 NOTE — HPI-TODAY'S VISIT:
October 2023 visit:Most recent labs from April 2023 revealed negative TB QuantiFERON, B12 390, normal C-reactive protein, low vitamin D 28, ferritin 34 with a low iron saturation of 12 and normal iron binding capacity.  Mild anemia with a hemoglobin of 11.3.  Hepatitis B surface antigen negative and hepatitis B antibody present.Normal fecal calprotectin in April 2023.Colonoscopy in July 2023 revealed adequate bowel prep with right-sided anastomosis, normal neoterminal ileum, segmental pseudopolyps in the ascending colon, no obvious endoscopic colitis throughout the colon, segmental pseudopolyps in the sigmoid colon, a large polypoid lesion in the descending colon, skin tags on perianal exam as well as perianal skin discoloration.  Overall endoscopic changes did not reveal any active colitis and were reflective of remodeling.  Biopsy from neoterminal ileum revealed no active inflammation.  Anastomosis biopsy confirmed ulcer and granulation tissue related to anastomotic changes.  No dysplasia or inflammation was noted in biopsies.  Biopsy from both polyps revealed benign mucosal polyps.

## 2023-10-19 NOTE — HPI-OTHER HISTORIES
June 2023 visit: Most recent labs from April and May 2023 revealed negative TB QuantiFERON, low normal B12 390, normal C-reactive protein, low vitamin D 28, low iron saturation 12, ferritin 34, mild anemia with a hemoglobin of 11.3, hepatitis B surface antigen negative, hepatitis B immune. Normal fecal calprotectin at 37. Prior to that a TB QuantiFERON was indeterminate in April 2023. completed vit D high dose for 8 weeks. also taking daily vit d supplement. also doing vit b 12 monthly. not on oral iron. has seen heme previously and did receive iv iron. cant tolerate oral iron. Bowel consistency varies with what she eats. diarrhea worse with eating vegetables / salads. no rectal bleeding. taking humira weekly. she has seen derm ( dr cummings ) this yr.  feb 2023 visit: doing humira every week. 2-3 BM per day. feels like humira alone isnt working well for her. no rectal bleeding. mild left lower abdominal discomfort on - off. she feels humira causes diarrhea to get worse. she says unable to get MRI entero because she has poor iv access. took budesonide from her older prescription which she said helped with diarrhea. she took it on her own whole of jan 2023. we ordered labs and MRI at her last visit in june 2022 but she never did these.  June 2022: Using lomotil prn. On Humira weekly. diarrhea is better. no abdominal pain.  March 2022 visit: Patient underwent a EGD and a colonoscopy in March 2022.  EGD revealed post fundoplication changes otherwise normal.  Colonoscopy revealed no visible endoscopic colitis throughout the colon but significant changes of remodeling from longstanding Crohn's disease present, right hemicolectomy with ileocolonic anastomosis that was patent but had ulceration, neoterminal ileum appeared normal, diverticulosis right distal to the anastomosis in the transverse colon, mildly stenotic appearing lumen in the sigmoid colon, descending colon and transverse colon, medium to large sized pedunculated and semipedunculated polyps present in parts of the colon that appeared to be benign. Gastric biopsy was benign, neoterminal ileum biopsies benign.  Biopsies from the anastomosis revealed chronic colitis with ulcer and granulation.  No endoscopic colitis seen in any biopsy.  All polyps were benign on biopsy. Stool test from February 22 revealed normal fecal calprotectin and also normal stool infection. Labs from December revealed normal B12, C-reactive protein, adequate Humira drug levels and low antibody levels.  Vitamin D 30, mild anemia with a hemoglobin of 10.  On budesonide. still having diarrhea. multiple BM per day. on weekly humira. on b 12 once a month.  Feb 2022 visit :   Labs from December 2021 adalimumab drug level 14 which is within therapeutic range, no antibodies present.  Normal B12 level.  Normal C-reactive protein, vitamin D level 30, normal CMP, anemia with a hemoglobin of 10, normal MCV 82.   Started having diarrhea , watery, scant blood, mild abdominal discomfort, no fever. on Humira weekly. was scheduled for colonoscopy but did not come in , now scheduled for march 2022.  November 2021 visit:    Elevated fecal calprotectin of 187 in September 2021.  Labs in August 2021 B12 430, normal C-reactive protein, low vitamin D 16, TB test negative, normal CMP, hemoglobin low 10.4  completed vit d treatment, now also using otc vit D. Weekly humira. Regular formed stools. on b 12 injections.  no heartburn.  no dysphagia.   July 2021 visit:   On Weekly Humira.Stopped 6MP because this this causes memory issues. She takes this prn when she thinks she get looser stools.  vit D def - didnt take meds since not covered by insurance. Also not recently taking B 12 injections. Did not have fecal calpro done as ordered. 1-2 BM per day. No rectal bleeding. Feels like Humira is working well.   Labs from May 2021 revealed normal vitamin B12 384, ferritin 88, C-reactive protein normal at 4, continued vitamin D deficiency 19.7, normal kidney function, normal liver enzymes, low Humira antibody 29, adequate Humira drug levels 12, slight anemia with a hemoglobin 10.3 otherwise normal CBC.  Fecal calprotectin was not done.  TB test was not done  April 2021:  Recovered from covid 19 without hospitalization in late 2020.   On weekly Humira. Stopped taking Imuran because she thinks this was causing memory loss.  Normal vitamin B12, low vitamin D at 16.9, TB test negative in June 2020, Humira drug levels 13, antibody levels low titer at 58, CRP 2. EGD August 2020 reveals a small amount of food in the gastric body, evidence of fundoplication, normal esophagus, no biopsies. 1 BM per day. No rectal bleeding. No abdominal pain. On monthly vit B 12 injection.  July 2020 : doing imuran 100 mg per day and weekly Humira. took meds for vit D def. on B 12 injections. feels CD is in better control. no abdominal pain. no diarrhea. occasional gerd. EGD in scheduled for next week. Colonoscopy in March 2020 revealed normal neoterminal ileum.  3 large pedunculated pseudopolyps in the transverse colon which were biopsied extensively.  Benign intrinsic appearing stenosis at approximately 60 cm, biopsied, large polypoid lesion at 55 cm also biopsied.  Relatively normal-appearing rectum, rectosigmoid, sigmoid colon and descending colon.  Extensive perianal skin tags.  Perianal skin discoloration.  Multiple semi-pedunculated polyps in the sigmoid colon.  Ulceration at the ileocolonic anastomosis. Biopsies from the anastomosis confirmed active colitis with mild activity.  Colonic stricture at 60 cm also revealed active colitis.  All other biopsies revealed benign mucosa. MR enterography February 2020 Limited exam because of motion artifact, short segment 4 cm thickening and mural enhancement of the terminal ileum.  Short segment of narrowing of the distal sigmoid and hepatic flexure without inflammatory changes.  Long segment thickening of the descending colon.  Images were not obtained to the level of the rectum and anus to evaluate the suspected left perianal and perirectal fistula seen on prior CT. Jan 2020 -  missed humira dose for abt 4 weeks. took it this week on monday. ran out of imuran last week. bowel freq 2-3 times per day. occasional dysphagia.  Dec  2019 -  Was having trouble getting meds from pharmacy, missed humira for 3 weeks. took humira yesterday. remains  on imuran 100 mg daily. Having more diarrhea now than usual. no rectal bleeding. was feeling much better when was regular on humira and imuran. now experiecing flareup symptoms. no fever / abdominal pain.  Labs Nov 2019 : CR 0.66, T BILI <0.2, ALP 60, AST 22, ALT 13, WBC 4.3, HB (10.5), , lipase 41, CRP 1. Oct 2019 -  aug labs reviewed. normal wbc count. Hb 11. normal LFTs. normal amylase and lipase. no diarrhea. no abdominal pain. no rectal bleeding. compliant to meds.  Aug 2019 -  s/p repair of hiatal hernia + fundoplication. stopped meds on her own during periop period ( stopped in early june - restarted in late july). but continued imuran. now back on humira every week and imuran.  normal cbc. normal kidney and liver function. no abdominal pain. occasional diarrhea. no fever. no rectal bleeding.  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 Has bowel movements 1 to 2 times a day, no anal bleeding, no abdominal pain. Presently on humira and imuran and reports some relief of symptoms. Patient is scheduled to see thoracic surgeon and will likely stop the medicine one to two weeks prior to surgery as directed by thoracic surgeon. April 2019 -  CT enterography march 2019  -normal small bowel. suspected perianal / perirectal fistula. very large hiatal hernia containing most of the stomach / part of pancreas and spleen. heterogenous uterus.  Humira dose increased to weekly. Also added imuran. labs march 2019 - low vit D. normal B 12. normal lfts. low ferritin. Hb 9. Fecal calprotectin 106.  Bowels are more formed. feeling better. got injectafor 1 shot. getting 1 more dose next week.  March 2019 -  Colonoscopy in feb 2019 revealed post op anatomy, likely ileocolonic anastomosis in TC. patchy pseudopolyps in entire colon. TC diverticulosis. perianal fistula and skin tags. mucosal bridge in sigmoid colon. Moderately active inflammation was present in ileocolonic anastomosis Bx but remainder colon biopsy was normal. TPMT activity - normal. Slightly lower Humira drug levels ( 7.8).  Humira antibody 196 ( intermediate antibody level) Labs in dec 2018 - Hb 10.4, low b 12 166, normal CMP.  TB neg. Hep B neg. CRP elevated to 18.  low Vit D 10. Continues to have diarrhea. ALso describes abdominal discomfort. Pt feels crohns is not well controlled.  DEC 2018 - Patient was last seen in May 2018 by Dr. Rodriges. On Humira. Last labs from April 2018: hgb 10, normal LFT's, normal renal function.  EGD Nov 2017 : large hiatal hernia, Schatzki's ring. Has previously been on Remicade - but stopped working in Dec 2017. Colonoscopy June 2017 : inflammed colon, ulcerated iliocolonic anastamosis, normal neoti pseudopolyps in the colon noted. Path: confirmed inflammation from ileal biopsy, colonic biopsy did not reveal any active crohn's disease. CD was diagnosed at age 11. On Humira in early 2018. Feels like its working. Bowel freq 5-6 times per day, loose stools. No FH of colon cancer. noticing hypopigmentation on skin. s/p right hemicolectomy in 1989. Perianal fistula not draining as bad as before.

## 2023-11-09 ENCOUNTER — OFFICE VISIT (OUTPATIENT)
Dept: URBAN - METROPOLITAN AREA CLINIC 83 | Facility: CLINIC | Age: 48
End: 2023-11-09
Payer: COMMERCIAL

## 2023-11-09 ENCOUNTER — OFFICE VISIT (OUTPATIENT)
Dept: URBAN - METROPOLITAN AREA CLINIC 84 | Facility: CLINIC | Age: 48
End: 2023-11-09
Payer: COMMERCIAL

## 2023-11-09 ENCOUNTER — LAB OUTSIDE AN ENCOUNTER (OUTPATIENT)
Dept: URBAN - METROPOLITAN AREA CLINIC 84 | Facility: CLINIC | Age: 48
End: 2023-11-09

## 2023-11-09 ENCOUNTER — DASHBOARD ENCOUNTERS (OUTPATIENT)
Age: 48
End: 2023-11-09

## 2023-11-09 VITALS
BODY MASS INDEX: 31.49 KG/M2 | TEMPERATURE: 98 F | DIASTOLIC BLOOD PRESSURE: 87 MMHG | SYSTOLIC BLOOD PRESSURE: 145 MMHG | WEIGHT: 212.6 LBS | HEART RATE: 73 BPM | HEIGHT: 69 IN

## 2023-11-09 DIAGNOSIS — R19.7 DIARRHEA, UNSPECIFIED TYPE: ICD-10-CM

## 2023-11-09 DIAGNOSIS — K21.9 CHRONIC GERD: ICD-10-CM

## 2023-11-09 DIAGNOSIS — D50.0 IRON DEFICIENCY ANEMIA DUE TO CHRONIC BLOOD LOSS: ICD-10-CM

## 2023-11-09 DIAGNOSIS — R10.84 GENERALIZED ABDOMINAL PAIN: ICD-10-CM

## 2023-11-09 DIAGNOSIS — E56.9 VITAMIN D DEFICIENCY: ICD-10-CM

## 2023-11-09 DIAGNOSIS — K50.80 CROHN'S DISEASE OF BOTH SMALL AND LARGE INTESTINE WITHOUT COMPLICATION: ICD-10-CM

## 2023-11-09 DIAGNOSIS — K50.118 CROHN'S DISEASE OF PERIANAL REGION WITH OTHER COMPLICATION: ICD-10-CM

## 2023-11-09 DIAGNOSIS — R19.7 ACUTE DIARRHEA: ICD-10-CM

## 2023-11-09 DIAGNOSIS — Z79.899 ADALIMUMAB (HUMIRA) LONG-TERM USE: ICD-10-CM

## 2023-11-09 DIAGNOSIS — E53.8 VITAMIN B12 DEFICIENCY: ICD-10-CM

## 2023-11-09 DIAGNOSIS — K44.9 HIATAL HERNIA - S/P SURGERY: ICD-10-CM

## 2023-11-09 DIAGNOSIS — E53.8 B12 DEFICIENCY: ICD-10-CM

## 2023-11-09 DIAGNOSIS — E53.8 VITAMIN B 12 DEFICIENCY: ICD-10-CM

## 2023-11-09 PROBLEM — 235796008: Status: ACTIVE | Noted: 2023-11-09

## 2023-11-09 PROBLEM — 235595009: Status: ACTIVE | Noted: 2023-11-09

## 2023-11-09 PROCEDURE — 99214 OFFICE O/P EST MOD 30 MIN: CPT | Performed by: INTERNAL MEDICINE

## 2023-11-09 PROCEDURE — 96372 THER/PROPH/DIAG INJ SC/IM: CPT | Performed by: INTERNAL MEDICINE

## 2023-11-09 RX ORDER — BUDESONIDE 3 MG/1
3 CAPSULES CAPSULE, COATED PELLETS ORAL ONCE A DAY
Qty: 90 CAPSULE | Refills: 1 | Status: ON HOLD | COMMUNITY
Start: 2022-02-18

## 2023-11-09 RX ORDER — CIPROFLOXACIN HYDROCHLORIDE 500 MG/1
1 TABLET TABLET, FILM COATED ORAL
Qty: 28 TABLET | Refills: 0 | OUTPATIENT
Start: 2023-11-09 | End: 2023-11-23

## 2023-11-09 RX ORDER — DIPHENOXYLATE HYDROCHLORIDE AND ATROPINE SULFATE 2.5; .025 MG/1; MG/1
1 TABLET AS NEEDED TABLET ORAL
Status: ON HOLD | COMMUNITY
Start: 2022-03-24

## 2023-11-09 RX ORDER — FAMOTIDINE 40 MG/1
1 TABLET AT BEDTIME TABLET, FILM COATED ORAL ONCE A DAY
Qty: 90 TABLET | Refills: 2 | OUTPATIENT
Start: 2023-11-09

## 2023-11-09 RX ORDER — METRONIDAZOLE 500 MG/1
1 TABLET TABLET ORAL THREE TIMES A DAY
Qty: 42 TABLET | Refills: 0 | OUTPATIENT
Start: 2023-11-09 | End: 2023-11-23

## 2023-11-09 RX ORDER — CIPROFLOXACIN HYDROCHLORIDE 500 MG/1
1 TABLET TABLET, FILM COATED ORAL
Qty: 28 TABLET | Refills: 0 | Status: ACTIVE | COMMUNITY
Start: 2023-11-09 | End: 2023-11-23

## 2023-11-09 RX ORDER — METRONIDAZOLE 500 MG/1
1 TABLET TABLET ORAL THREE TIMES A DAY
Qty: 42 TABLET | Refills: 0 | Status: ACTIVE | COMMUNITY
Start: 2023-11-09 | End: 2023-11-23

## 2023-11-09 RX ORDER — FAMOTIDINE 40 MG/1
1 TABLET AT BEDTIME TABLET, FILM COATED ORAL ONCE A DAY
Qty: 90 TABLET | Refills: 2 | Status: ACTIVE | COMMUNITY
Start: 2023-11-09

## 2023-11-09 NOTE — HPI-TODAY'S VISIT:
Nov 2023 visit: Most recent labs from April 2023 revealed negative TB QuantiFERON, B12 390, normal C-reactive protein, low vitamin D 28, ferritin 34 with a low iron saturation of 12 and normal iron binding capacity.  Mild anemia with a hemoglobin of 11.3.  Hepatitis B surface antigen negative and hepatitis B antibody present.Normal fecal calprotectin in April 2023.Colonoscopy in July 2023 revealed adequate bowel prep with right-sided anastomosis, normal neoterminal ileum, segmental pseudopolyps in the ascending colon, no obvious endoscopic colitis throughout the colon, segmental pseudopolyps in the sigmoid colon, a large polypoid lesion in the descending colon, skin tags on perianal exam as well as perianal skin discoloration.  Overall endoscopic changes did not reveal any active colitis and were reflective of remodeling.  Biopsy from neoterminal ileum revealed no active inflammation.  Anastomosis biopsy confirmed ulcer and granulation tissue related to anastomotic changes.  No dysplasia or inflammation was noted in biopsies.  Biopsy from both polyps revealed benign mucosal polyps.  iron makes her constipated so not taking. Humira weekly. bowel freq at baseline. mild gerd symptoms. she reports perianal leakage. vague abdominal pains, feels like something is moving inside of her. she worries she had a abdominal hernia.

## 2023-11-10 ENCOUNTER — TELEPHONE ENCOUNTER (OUTPATIENT)
Dept: URBAN - METROPOLITAN AREA CLINIC 92 | Facility: CLINIC | Age: 48
End: 2023-11-10

## 2023-12-08 ENCOUNTER — OFFICE VISIT (OUTPATIENT)
Dept: URBAN - METROPOLITAN AREA CLINIC 83 | Facility: CLINIC | Age: 48
End: 2023-12-08
Payer: COMMERCIAL

## 2023-12-08 DIAGNOSIS — E53.8 VITAMIN B12 DEFICIENCY: ICD-10-CM

## 2023-12-08 PROCEDURE — 96372 THER/PROPH/DIAG INJ SC/IM: CPT | Performed by: INTERNAL MEDICINE

## 2023-12-08 RX ORDER — FAMOTIDINE 40 MG/1
1 TABLET AT BEDTIME TABLET, FILM COATED ORAL ONCE A DAY
Qty: 90 TABLET | Refills: 2 | Status: ACTIVE | COMMUNITY
Start: 2023-11-09

## 2023-12-08 RX ORDER — DIPHENOXYLATE HYDROCHLORIDE AND ATROPINE SULFATE 2.5; .025 MG/1; MG/1
1 TABLET AS NEEDED TABLET ORAL
Status: ON HOLD | COMMUNITY
Start: 2022-03-24

## 2023-12-08 RX ORDER — BUDESONIDE 3 MG/1
3 CAPSULES CAPSULE, COATED PELLETS ORAL ONCE A DAY
Qty: 90 CAPSULE | Refills: 1 | Status: ON HOLD | COMMUNITY
Start: 2022-02-18

## 2023-12-10 LAB
CALPROTECTIN, FECAL: 96
CLOSTRIDIUM DIFFICILE TOXINB,QL REAL TIME PCR: NOT DETECTED

## 2024-01-04 ENCOUNTER — TELEPHONE ENCOUNTER (OUTPATIENT)
Dept: URBAN - METROPOLITAN AREA CLINIC 25 | Facility: CLINIC | Age: 49
End: 2024-01-04

## 2024-01-09 ENCOUNTER — TELEPHONE ENCOUNTER (OUTPATIENT)
Dept: URBAN - METROPOLITAN AREA CLINIC 92 | Facility: CLINIC | Age: 49
End: 2024-01-09

## 2024-01-12 ENCOUNTER — OFFICE VISIT (OUTPATIENT)
Dept: URBAN - METROPOLITAN AREA CLINIC 83 | Facility: CLINIC | Age: 49
End: 2024-01-12
Payer: COMMERCIAL

## 2024-01-12 DIAGNOSIS — E53.8 VITAMIN B12 DEFICIENCY: ICD-10-CM

## 2024-01-12 PROCEDURE — 96372 THER/PROPH/DIAG INJ SC/IM: CPT | Performed by: INTERNAL MEDICINE

## 2024-01-24 ENCOUNTER — TELEPHONE ENCOUNTER (OUTPATIENT)
Dept: URBAN - METROPOLITAN AREA CLINIC 25 | Facility: CLINIC | Age: 49
End: 2024-01-24

## 2024-01-24 RX ORDER — ADALIMUMAB 40MG/0.4ML
ONCE KIT SUBCUTANEOUS
Qty: 4 | Refills: 11 | OUTPATIENT
Start: 2024-01-24 | End: 2025-01-18

## 2024-02-16 ENCOUNTER — B12 (OUTPATIENT)
Dept: URBAN - METROPOLITAN AREA CLINIC 83 | Facility: CLINIC | Age: 49
End: 2024-02-16
Payer: COMMERCIAL

## 2024-02-16 DIAGNOSIS — E53.8 VITAMIN B12 DEFICIENCY: ICD-10-CM

## 2024-02-16 PROCEDURE — 96372 THER/PROPH/DIAG INJ SC/IM: CPT | Performed by: INTERNAL MEDICINE

## 2024-02-16 RX ORDER — FAMOTIDINE 40 MG/1
1 TABLET AT BEDTIME TABLET, FILM COATED ORAL ONCE A DAY
Qty: 90 TABLET | Refills: 2 | Status: ACTIVE | COMMUNITY
Start: 2023-11-09

## 2024-02-16 RX ORDER — DIPHENOXYLATE HYDROCHLORIDE AND ATROPINE SULFATE 2.5; .025 MG/1; MG/1
1 TABLET AS NEEDED TABLET ORAL
Status: ON HOLD | COMMUNITY
Start: 2022-03-24

## 2024-02-16 RX ORDER — BUDESONIDE 3 MG/1
3 CAPSULES CAPSULE, COATED PELLETS ORAL ONCE A DAY
Qty: 90 CAPSULE | Refills: 1 | Status: ON HOLD | COMMUNITY
Start: 2022-02-18

## 2024-02-16 RX ORDER — ADALIMUMAB 40MG/0.4ML
ONCE KIT SUBCUTANEOUS
Qty: 4 | Refills: 11 | Status: ACTIVE | COMMUNITY
Start: 2024-01-24 | End: 2025-01-18

## 2024-03-22 ENCOUNTER — B12 (OUTPATIENT)
Dept: URBAN - METROPOLITAN AREA CLINIC 83 | Facility: CLINIC | Age: 49
End: 2024-03-22
Payer: COMMERCIAL

## 2024-03-22 DIAGNOSIS — E53.8 VITAMIN B12 DEFICIENCY: ICD-10-CM

## 2024-03-22 PROCEDURE — 96372 THER/PROPH/DIAG INJ SC/IM: CPT | Performed by: INTERNAL MEDICINE

## 2024-03-22 RX ORDER — DIPHENOXYLATE HYDROCHLORIDE AND ATROPINE SULFATE 2.5; .025 MG/1; MG/1
1 TABLET AS NEEDED TABLET ORAL
Status: ON HOLD | COMMUNITY
Start: 2022-03-24

## 2024-03-22 RX ORDER — ADALIMUMAB 40MG/0.4ML
ONCE KIT SUBCUTANEOUS
Qty: 4 | Refills: 11 | Status: ACTIVE | COMMUNITY
Start: 2024-01-24 | End: 2025-01-18

## 2024-03-22 RX ORDER — FAMOTIDINE 40 MG/1
1 TABLET AT BEDTIME TABLET, FILM COATED ORAL ONCE A DAY
Qty: 90 TABLET | Refills: 2 | Status: ACTIVE | COMMUNITY
Start: 2023-11-09

## 2024-03-22 RX ORDER — BUDESONIDE 3 MG/1
3 CAPSULES CAPSULE, COATED PELLETS ORAL ONCE A DAY
Qty: 90 CAPSULE | Refills: 1 | Status: ON HOLD | COMMUNITY
Start: 2022-02-18

## 2024-05-01 ENCOUNTER — ERX REFILL RESPONSE (OUTPATIENT)
Dept: URBAN - METROPOLITAN AREA CLINIC 25 | Facility: CLINIC | Age: 49
End: 2024-05-01

## 2024-05-01 RX ORDER — ADALIMUMAB 40MG/0.4ML
1 SUBQ Q WEEK KIT SUBCUTANEOUS
Qty: 4 | Refills: 11 | OUTPATIENT

## 2024-05-17 ENCOUNTER — TELEPHONE ENCOUNTER (OUTPATIENT)
Dept: URBAN - METROPOLITAN AREA CLINIC 25 | Facility: CLINIC | Age: 49
End: 2024-05-17

## 2024-05-17 RX ORDER — ADALIMUMAB 40MG/0.4ML
1 SUBQ Q WEEK KIT SUBCUTANEOUS
Qty: 4 | Refills: 11
End: 2025-05-12

## 2024-06-10 ENCOUNTER — TELEPHONE ENCOUNTER (OUTPATIENT)
Dept: URBAN - METROPOLITAN AREA CLINIC 92 | Facility: CLINIC | Age: 49
End: 2024-06-10

## 2024-06-25 ENCOUNTER — OFFICE VISIT (OUTPATIENT)
Dept: URBAN - METROPOLITAN AREA CLINIC 83 | Facility: CLINIC | Age: 49
End: 2024-06-25

## 2024-06-27 ENCOUNTER — OFFICE VISIT (OUTPATIENT)
Dept: URBAN - METROPOLITAN AREA CLINIC 83 | Facility: CLINIC | Age: 49
End: 2024-06-27
Payer: COMMERCIAL

## 2024-06-27 ENCOUNTER — OFFICE VISIT (OUTPATIENT)
Dept: URBAN - METROPOLITAN AREA CLINIC 84 | Facility: CLINIC | Age: 49
End: 2024-06-27
Payer: COMMERCIAL

## 2024-06-27 VITALS
HEIGHT: 69 IN | TEMPERATURE: 97.5 F | HEART RATE: 73 BPM | DIASTOLIC BLOOD PRESSURE: 87 MMHG | SYSTOLIC BLOOD PRESSURE: 127 MMHG | WEIGHT: 200 LBS | BODY MASS INDEX: 29.62 KG/M2

## 2024-06-27 DIAGNOSIS — Z79.899 ADALIMUMAB (HUMIRA) LONG-TERM USE: ICD-10-CM

## 2024-06-27 DIAGNOSIS — K21.9 CHRONIC GERD: ICD-10-CM

## 2024-06-27 DIAGNOSIS — K44.9 HIATAL HERNIA - S/P SURGERY: ICD-10-CM

## 2024-06-27 DIAGNOSIS — K50.118 CROHN'S DISEASE OF PERIANAL REGION WITH OTHER COMPLICATION: ICD-10-CM

## 2024-06-27 DIAGNOSIS — K50.80 CROHN'S DISEASE OF BOTH SMALL AND LARGE INTESTINE WITHOUT COMPLICATION: ICD-10-CM

## 2024-06-27 DIAGNOSIS — R19.7 INTERMITTENT DIARRHEA: ICD-10-CM

## 2024-06-27 DIAGNOSIS — E53.8 VITAMIN B12 DEFICIENCY: ICD-10-CM

## 2024-06-27 DIAGNOSIS — E53.8 VITAMIN B 12 DEFICIENCY: ICD-10-CM

## 2024-06-27 DIAGNOSIS — E56.9 VITAMIN D DEFICIENCY: ICD-10-CM

## 2024-06-27 PROCEDURE — 99214 OFFICE O/P EST MOD 30 MIN: CPT | Performed by: INTERNAL MEDICINE

## 2024-06-27 PROCEDURE — 96372 THER/PROPH/DIAG INJ SC/IM: CPT | Performed by: INTERNAL MEDICINE

## 2024-06-27 RX ORDER — ADALIMUMAB 40MG/0.4ML
ONCE KIT SUBCUTANEOUS
Qty: 4 | Refills: 11 | Status: ACTIVE | COMMUNITY
Start: 2024-01-24 | End: 2025-01-18

## 2024-06-27 RX ORDER — BUDESONIDE 3 MG/1
3 CAPSULES CAPSULE, COATED PELLETS ORAL ONCE A DAY
Qty: 90 CAPSULE | Refills: 1 | Status: ON HOLD | COMMUNITY
Start: 2022-02-18

## 2024-06-27 RX ORDER — ADALIMUMAB 40MG/0.4ML
1 SUBQ Q WEEK KIT SUBCUTANEOUS
Qty: 4 | Refills: 11 | Status: ACTIVE | COMMUNITY
End: 2025-05-12

## 2024-06-27 RX ORDER — DIPHENOXYLATE HYDROCHLORIDE AND ATROPINE SULFATE 2.5; .025 MG/1; MG/1
1 TABLET AS NEEDED TABLET ORAL
OUTPATIENT
Start: 2024-06-27

## 2024-06-27 RX ORDER — FAMOTIDINE 40 MG/1
1 TABLET AT BEDTIME TABLET, FILM COATED ORAL ONCE A DAY
Qty: 90 TABLET | Refills: 2 | Status: ACTIVE | COMMUNITY
Start: 2023-11-09

## 2024-06-27 RX ORDER — DIPHENOXYLATE HYDROCHLORIDE AND ATROPINE SULFATE 2.5; .025 MG/1; MG/1
1 TABLET AS NEEDED TABLET ORAL
Status: ON HOLD | COMMUNITY
Start: 2022-03-24

## 2024-06-27 NOTE — HPI-OTHER HISTORIES
Nov 2023 visit: Most recent labs from April 2023 revealed negative TB QuantiFERON, B12 390, normal C-reactive protein, low vitamin D 28, ferritin 34 with a low iron saturation of 12 and normal iron binding capacity. Mild anemia with a hemoglobin of 11.3. Hepatitis B surface antigen negative and hepatitis B antibody present.Normal fecal calprotectin in April 2023.Colonoscopy in July 2023 revealed adequate bowel prep with right-sided anastomosis, normal neoterminal ileum, segmental pseudopolyps in the ascending colon, no obvious endoscopic colitis throughout the colon, segmental pseudopolyps in the sigmoid colon, a large polypoid lesion in the descending colon, skin tags on perianal exam as well as perianal skin discoloration. Overall endoscopic changes did not reveal any active colitis and were reflective of remodeling. Biopsy from neoterminal ileum revealed no active inflammation. Anastomosis biopsy confirmed ulcer and granulation tissue related to anastomotic changes. No dysplasia or inflammation was noted in biopsies. Biopsy from both polyps revealed benign mucosal polyps.  iron makes her constipated so not taking. Humira weekly. bowel freq at baseline. mild gerd symptoms. she reports perianal leakage. vague abdominal pains, feels like something is moving inside of her. she worries she had a abdominal hernia.  June 2023 visit: Most recent labs from April and May 2023 revealed negative TB QuantiFERON, low normal B12 390, normal C-reactive protein, low vitamin D 28, low iron saturation 12, ferritin 34, mild anemia with a hemoglobin of 11.3, hepatitis B surface antigen negative, hepatitis B immune. Normal fecal calprotectin at 37. Prior to that a TB QuantiFERON was indeterminate in April 2023. completed vit D high dose for 8 weeks. also taking daily vit d supplement. also doing vit b 12 monthly. not on oral iron. has seen heme previously and did receive iv iron. cant tolerate oral iron. Bowel consistency varies with what she eats. diarrhea worse with eating vegetables / salads. no rectal bleeding. taking humira weekly. she has seen derm ( dr cummings ) this yr.  feb 2023 visit: doing humira every week. 2-3 BM per day. feels like humira alone isnt working well for her. no rectal bleeding. mild left lower abdominal discomfort on - off. she feels humira causes diarrhea to get worse. she says unable to get MRI entero because she has poor iv access. took budesonide from her older prescription which she said helped with diarrhea. she took it on her own whole of jan 2023. we ordered labs and MRI at her last visit in june 2022 but she never did these.  June 2022: Using lomotil prn. On Humira weekly. diarrhea is better. no abdominal pain.  March 2022 visit: Patient underwent a EGD and a colonoscopy in March 2022.  EGD revealed post fundoplication changes otherwise normal.  Colonoscopy revealed no visible endoscopic colitis throughout the colon but significant changes of remodeling from longstanding Crohn's disease present, right hemicolectomy with ileocolonic anastomosis that was patent but had ulceration, neoterminal ileum appeared normal, diverticulosis right distal to the anastomosis in the transverse colon, mildly stenotic appearing lumen in the sigmoid colon, descending colon and transverse colon, medium to large sized pedunculated and semipedunculated polyps present in parts of the colon that appeared to be benign. Gastric biopsy was benign, neoterminal ileum biopsies benign.  Biopsies from the anastomosis revealed chronic colitis with ulcer and granulation.  No endoscopic colitis seen in any biopsy.  All polyps were benign on biopsy. Stool test from February 22 revealed normal fecal calprotectin and also normal stool infection. Labs from December revealed normal B12, C-reactive protein, adequate Humira drug levels and low antibody levels.  Vitamin D 30, mild anemia with a hemoglobin of 10.  On budesonide. still having diarrhea. multiple BM per day. on weekly humira. on b 12 once a month.  Feb 2022 visit :   Labs from December 2021 adalimumab drug level 14 which is within therapeutic range, no antibodies present.  Normal B12 level.  Normal C-reactive protein, vitamin D level 30, normal CMP, anemia with a hemoglobin of 10, normal MCV 82.   Started having diarrhea , watery, scant blood, mild abdominal discomfort, no fever. on Humira weekly. was scheduled for colonoscopy but did not come in , now scheduled for march 2022.  November 2021 visit:    Elevated fecal calprotectin of 187 in September 2021.  Labs in August 2021 B12 430, normal C-reactive protein, low vitamin D 16, TB test negative, normal CMP, hemoglobin low 10.4  completed vit d treatment, now also using otc vit D. Weekly humira. Regular formed stools. on b 12 injections.  no heartburn.  no dysphagia.   July 2021 visit:   On Weekly Humira.Stopped 6MP because this this causes memory issues. She takes this prn when she thinks she get looser stools.  vit D def - didnt take meds since not covered by insurance. Also not recently taking B 12 injections. Did not have fecal calpro done as ordered. 1-2 BM per day. No rectal bleeding. Feels like Humira is working well.   Labs from May 2021 revealed normal vitamin B12 384, ferritin 88, C-reactive protein normal at 4, continued vitamin D deficiency 19.7, normal kidney function, normal liver enzymes, low Humira antibody 29, adequate Humira drug levels 12, slight anemia with a hemoglobin 10.3 otherwise normal CBC.  Fecal calprotectin was not done.  TB test was not done  April 2021:  Recovered from covid 19 without hospitalization in late 2020.   On weekly Humira. Stopped taking Imuran because she thinks this was causing memory loss.  Normal vitamin B12, low vitamin D at 16.9, TB test negative in June 2020, Humira drug levels 13, antibody levels low titer at 58, CRP 2. EGD August 2020 reveals a small amount of food in the gastric body, evidence of fundoplication, normal esophagus, no biopsies. 1 BM per day. No rectal bleeding. No abdominal pain. On monthly vit B 12 injection.  July 2020 : doing imuran 100 mg per day and weekly Humira. took meds for vit D def. on B 12 injections. feels CD is in better control. no abdominal pain. no diarrhea. occasional gerd. EGD in scheduled for next week. Colonoscopy in March 2020 revealed normal neoterminal ileum.  3 large pedunculated pseudopolyps in the transverse colon which were biopsied extensively.  Benign intrinsic appearing stenosis at approximately 60 cm, biopsied, large polypoid lesion at 55 cm also biopsied.  Relatively normal-appearing rectum, rectosigmoid, sigmoid colon and descending colon.  Extensive perianal skin tags.  Perianal skin discoloration.  Multiple semi-pedunculated polyps in the sigmoid colon.  Ulceration at the ileocolonic anastomosis. Biopsies from the anastomosis confirmed active colitis with mild activity.  Colonic stricture at 60 cm also revealed active colitis.  All other biopsies revealed benign mucosa. MR enterography February 2020 Limited exam because of motion artifact, short segment 4 cm thickening and mural enhancement of the terminal ileum.  Short segment of narrowing of the distal sigmoid and hepatic flexure without inflammatory changes.  Long segment thickening of the descending colon.  Images were not obtained to the level of the rectum and anus to evaluate the suspected left perianal and perirectal fistula seen on prior CT. Jan 2020 -  missed humira dose for abt 4 weeks. took it this week on monday. ran out of imuran last week. bowel freq 2-3 times per day. occasional dysphagia.  Dec  2019 -  Was having trouble getting meds from pharmacy, missed humira for 3 weeks. took humira yesterday. remains  on imuran 100 mg daily. Having more diarrhea now than usual. no rectal bleeding. was feeling much better when was regular on humira and imuran. now experiecing flareup symptoms. no fever / abdominal pain.  Labs Nov 2019 : CR 0.66, T BILI <0.2, ALP 60, AST 22, ALT 13, WBC 4.3, HB (10.5), , lipase 41, CRP 1. Oct 2019 -  aug labs reviewed. normal wbc count. Hb 11. normal LFTs. normal amylase and lipase. no diarrhea. no abdominal pain. no rectal bleeding. compliant to meds.  Aug 2019 -  s/p repair of hiatal hernia + fundoplication. stopped meds on her own during periop period ( stopped in early june - restarted in late july). but continued imuran. now back on humira every week and imuran.  normal cbc. normal kidney and liver function. no abdominal pain. occasional diarrhea. no fever. no rectal bleeding.  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 Has bowel movements 1 to 2 times a day, no anal bleeding, no abdominal pain. Presently on humira and imuran and reports some relief of symptoms. Patient is scheduled to see thoracic surgeon and will likely stop the medicine one to two weeks prior to surgery as directed by thoracic surgeon. April 2019 -  CT enterography march 2019  -normal small bowel. suspected perianal / perirectal fistula. very large hiatal hernia containing most of the stomach / part of pancreas and spleen. heterogenous uterus.  Humira dose increased to weekly. Also added imuran. labs march 2019 - low vit D. normal B 12. normal lfts. low ferritin. Hb 9. Fecal calprotectin 106.  Bowels are more formed. feeling better. got injectafor 1 shot. getting 1 more dose next week.  March 2019 -  Colonoscopy in feb 2019 revealed post op anatomy, likely ileocolonic anastomosis in TC. patchy pseudopolyps in entire colon. TC diverticulosis. perianal fistula and skin tags. mucosal bridge in sigmoid colon. Moderately active inflammation was present in ileocolonic anastomosis Bx but remainder colon biopsy was normal. TPMT activity - normal. Slightly lower Humira drug levels ( 7.8).  Humira antibody 196 ( intermediate antibody level) Labs in dec 2018 - Hb 10.4, low b 12 166, normal CMP.  TB neg. Hep B neg. CRP elevated to 18.  low Vit D 10. Continues to have diarrhea. ALso describes abdominal discomfort. Pt feels crohns is not well controlled.  DEC 2018 - Patient was last seen in May 2018 by Dr. Rodriges. On Humira. Last labs from April 2018: hgb 10, normal LFT's, normal renal function.  EGD Nov 2017 : large hiatal hernia, Schatzki's ring. Has previously been on Remicade - but stopped working in Dec 2017. Colonoscopy June 2017 : inflammed colon, ulcerated iliocolonic anastamosis, normal neoti pseudopolyps in the colon noted. Path: confirmed inflammation from ileal biopsy, colonic biopsy did not reveal any active crohn's disease. CD was diagnosed at age 11. On Humira in early 2018. Feels like its working. Bowel freq 5-6 times per day, loose stools. No FH of colon cancer. noticing hypopigmentation on skin. s/p right hemicolectomy in 1989. Perianal fistula not draining as bad as before.

## 2024-06-27 NOTE — HPI-TODAY'S VISIT:
June 2024: no issues with humira, doing weekly. GI symptoms are in remission. skipped a few b 12 monthly injections. dr aleksandr JOSEPH, excision of skin tags, fistulectomy. got b 12 injection today.

## 2024-07-23 ENCOUNTER — TELEPHONE ENCOUNTER (OUTPATIENT)
Dept: URBAN - METROPOLITAN AREA CLINIC 25 | Facility: CLINIC | Age: 49
End: 2024-07-23

## 2024-07-29 ENCOUNTER — OFFICE VISIT (OUTPATIENT)
Dept: URBAN - METROPOLITAN AREA CLINIC 83 | Facility: CLINIC | Age: 49
End: 2024-07-29
Payer: COMMERCIAL

## 2024-07-29 DIAGNOSIS — E53.8 VITAMIN B12 DEFICIENCY: ICD-10-CM

## 2024-07-29 PROCEDURE — 96372 THER/PROPH/DIAG INJ SC/IM: CPT | Performed by: INTERNAL MEDICINE

## 2024-07-29 RX ORDER — ADALIMUMAB 40MG/0.4ML
ONCE KIT SUBCUTANEOUS
Qty: 4 | Refills: 11 | Status: ACTIVE | COMMUNITY
Start: 2024-01-24 | End: 2025-01-18

## 2024-07-29 RX ORDER — FAMOTIDINE 40 MG/1
1 TABLET AT BEDTIME TABLET, FILM COATED ORAL ONCE A DAY
Qty: 90 TABLET | Refills: 2 | Status: ACTIVE | COMMUNITY
Start: 2023-11-09

## 2024-07-29 RX ORDER — BUDESONIDE 3 MG/1
3 CAPSULES CAPSULE, COATED PELLETS ORAL ONCE A DAY
Qty: 90 CAPSULE | Refills: 1 | Status: ON HOLD | COMMUNITY
Start: 2022-02-18

## 2024-07-29 RX ORDER — ADALIMUMAB 40MG/0.4ML
1 SUBQ Q WEEK KIT SUBCUTANEOUS
Qty: 4 | Refills: 11 | Status: ACTIVE | COMMUNITY
End: 2025-05-12

## 2024-07-29 RX ORDER — DIPHENOXYLATE HYDROCHLORIDE AND ATROPINE SULFATE 2.5; .025 MG/1; MG/1
1 TABLET AS NEEDED TABLET ORAL
Status: ACTIVE | COMMUNITY
Start: 2024-06-27

## 2024-07-29 RX ORDER — DIPHENOXYLATE HYDROCHLORIDE AND ATROPINE SULFATE 2.5; .025 MG/1; MG/1
1 TABLET AS NEEDED TABLET ORAL
Status: ON HOLD | COMMUNITY
Start: 2022-03-24

## 2024-10-24 ENCOUNTER — OFFICE VISIT (OUTPATIENT)
Dept: URBAN - METROPOLITAN AREA CLINIC 84 | Facility: CLINIC | Age: 49
End: 2024-10-24

## 2024-10-24 ENCOUNTER — OFFICE VISIT (OUTPATIENT)
Dept: URBAN - METROPOLITAN AREA CLINIC 83 | Facility: CLINIC | Age: 49
End: 2024-10-24

## 2025-01-16 ENCOUNTER — OFFICE VISIT (OUTPATIENT)
Dept: URBAN - METROPOLITAN AREA CLINIC 84 | Facility: CLINIC | Age: 50
End: 2025-01-16
Payer: COMMERCIAL

## 2025-01-16 VITALS
WEIGHT: 208 LBS | SYSTOLIC BLOOD PRESSURE: 118 MMHG | HEART RATE: 75 BPM | HEIGHT: 69 IN | BODY MASS INDEX: 30.81 KG/M2 | TEMPERATURE: 97.1 F | DIASTOLIC BLOOD PRESSURE: 82 MMHG

## 2025-01-16 DIAGNOSIS — K50.118 CROHN'S DISEASE OF PERIANAL REGION WITH OTHER COMPLICATION: ICD-10-CM

## 2025-01-16 DIAGNOSIS — D51.9 VITAMIN B12 DEFICIENCY ANEMIA, UNSPECIFIED: ICD-10-CM

## 2025-01-16 DIAGNOSIS — R19.7 INTERMITTENT DIARRHEA: ICD-10-CM

## 2025-01-16 DIAGNOSIS — K44.9 HIATAL HERNIA - S/P SURGERY: ICD-10-CM

## 2025-01-16 DIAGNOSIS — Z79.899 ADALIMUMAB (HUMIRA) LONG-TERM USE: ICD-10-CM

## 2025-01-16 DIAGNOSIS — E56.9 VITAMIN D DEFICIENCY: ICD-10-CM

## 2025-01-16 DIAGNOSIS — K21.9 CHRONIC GERD: ICD-10-CM

## 2025-01-16 DIAGNOSIS — K50.80 CROHN'S DISEASE OF BOTH SMALL AND LARGE INTESTINE WITHOUT COMPLICATION: ICD-10-CM

## 2025-01-16 DIAGNOSIS — E53.8 VITAMIN B 12 DEFICIENCY: ICD-10-CM

## 2025-01-16 PROCEDURE — 96372 THER/PROPH/DIAG INJ SC/IM: CPT | Performed by: INTERNAL MEDICINE

## 2025-01-16 PROCEDURE — 99214 OFFICE O/P EST MOD 30 MIN: CPT | Performed by: INTERNAL MEDICINE

## 2025-01-16 RX ORDER — DIPHENOXYLATE HYDROCHLORIDE AND ATROPINE SULFATE 2.5; .025 MG/1; MG/1
1 TABLET AS NEEDED TABLET ORAL
OUTPATIENT

## 2025-01-16 RX ORDER — DIPHENOXYLATE HYDROCHLORIDE AND ATROPINE SULFATE 2.5; .025 MG/1; MG/1
1 TABLET AS NEEDED TABLET ORAL
Status: ON HOLD | COMMUNITY
Start: 2022-03-24

## 2025-01-16 RX ORDER — DIPHENOXYLATE HYDROCHLORIDE AND ATROPINE SULFATE 2.5; .025 MG/1; MG/1
1 TABLET AS NEEDED TABLET ORAL
Status: ACTIVE | COMMUNITY
Start: 2024-06-27

## 2025-01-16 RX ORDER — BUDESONIDE 3 MG/1
3 CAPSULES CAPSULE, COATED PELLETS ORAL ONCE A DAY
Qty: 90 CAPSULE | Refills: 1 | Status: ON HOLD | COMMUNITY
Start: 2022-02-18

## 2025-01-16 RX ORDER — DIPHENOXYLATE HYDROCHLORIDE AND ATROPINE SULFATE 2.5; .025 MG/1; MG/1
1 TABLET AS NEEDED TABLET ORAL
Status: ACTIVE | COMMUNITY

## 2025-01-16 RX ORDER — FAMOTIDINE 40 MG/1
1 TABLET AT BEDTIME TABLET, FILM COATED ORAL ONCE A DAY
Qty: 90 TABLET | Refills: 2 | Status: DISCONTINUED | COMMUNITY
Start: 2023-11-09

## 2025-01-16 RX ORDER — ADALIMUMAB 40MG/0.4ML
1 SUBQ Q WEEK KIT SUBCUTANEOUS
Qty: 4 | Refills: 11 | Status: ACTIVE | COMMUNITY
End: 2025-05-12

## 2025-01-16 RX ORDER — ADALIMUMAB 40MG/0.4ML
ONCE KIT SUBCUTANEOUS
Qty: 4 | Refills: 11 | Status: ACTIVE | COMMUNITY
Start: 2024-01-24 | End: 2025-01-18

## 2025-01-16 NOTE — HPI-OTHER HISTORIES
June 2024: no issues with humira, doing weekly. GI symptoms are in remission. skipped a few b 12 monthly injections. dr aleksandr JOSEPH, excision of skin tags, fistulectomy. got b 12 injection today.  Nov 2023 visit: Most recent labs from April 2023 revealed negative TB QuantiFERON, B12 390, normal C-reactive protein, low vitamin D 28, ferritin 34 with a low iron saturation of 12 and normal iron binding capacity. Mild anemia with a hemoglobin of 11.3. Hepatitis B surface antigen negative and hepatitis B antibody present.Normal fecal calprotectin in April 2023.Colonoscopy in July 2023 revealed adequate bowel prep with right-sided anastomosis, normal neoterminal ileum, segmental pseudopolyps in the ascending colon, no obvious endoscopic colitis throughout the colon, segmental pseudopolyps in the sigmoid colon, a large polypoid lesion in the descending colon, skin tags on perianal exam as well as perianal skin discoloration. Overall endoscopic changes did not reveal any active colitis and were reflective of remodeling. Biopsy from neoterminal ileum revealed no active inflammation. Anastomosis biopsy confirmed ulcer and granulation tissue related to anastomotic changes. No dysplasia or inflammation was noted in biopsies. Biopsy from both polyps revealed benign mucosal polyps.  iron makes her constipated so not taking. Humira weekly. bowel freq at baseline. mild gerd symptoms. she reports perianal leakage. vague abdominal pains, feels like something is moving inside of her. she worries she had a abdominal hernia.  June 2023 visit: Most recent labs from April and May 2023 revealed negative TB QuantiFERON, low normal B12 390, normal C-reactive protein, low vitamin D 28, low iron saturation 12, ferritin 34, mild anemia with a hemoglobin of 11.3, hepatitis B surface antigen negative, hepatitis B immune. Normal fecal calprotectin at 37. Prior to that a TB QuantiFERON was indeterminate in April 2023. completed vit D high dose for 8 weeks. also taking daily vit d supplement. also doing vit b 12 monthly. not on oral iron. has seen heme previously and did receive iv iron. cant tolerate oral iron. Bowel consistency varies with what she eats. diarrhea worse with eating vegetables / salads. no rectal bleeding. taking humira weekly. she has seen derm ( dr cummings ) this yr.  feb 2023 visit: doing humira every week. 2-3 BM per day. feels like humira alone isnt working well for her. no rectal bleeding. mild left lower abdominal discomfort on - off. she feels humira causes diarrhea to get worse. she says unable to get MRI entero because she has poor iv access. took budesonide from her older prescription which she said helped with diarrhea. she took it on her own whole of jan 2023. we ordered labs and MRI at her last visit in june 2022 but she never did these.  June 2022: Using lomotil prn. On Humira weekly. diarrhea is better. no abdominal pain.  March 2022 visit: Patient underwent a EGD and a colonoscopy in March 2022.  EGD revealed post fundoplication changes otherwise normal.  Colonoscopy revealed no visible endoscopic colitis throughout the colon but significant changes of remodeling from longstanding Crohn's disease present, right hemicolectomy with ileocolonic anastomosis that was patent but had ulceration, neoterminal ileum appeared normal, diverticulosis right distal to the anastomosis in the transverse colon, mildly stenotic appearing lumen in the sigmoid colon, descending colon and transverse colon, medium to large sized pedunculated and semipedunculated polyps present in parts of the colon that appeared to be benign. Gastric biopsy was benign, neoterminal ileum biopsies benign.  Biopsies from the anastomosis revealed chronic colitis with ulcer and granulation.  No endoscopic colitis seen in any biopsy.  All polyps were benign on biopsy. Stool test from February 22 revealed normal fecal calprotectin and also normal stool infection. Labs from December revealed normal B12, C-reactive protein, adequate Humira drug levels and low antibody levels.  Vitamin D 30, mild anemia with a hemoglobin of 10.  On budesonide. still having diarrhea. multiple BM per day. on weekly humira. on b 12 once a month.  Feb 2022 visit :   Labs from December 2021 adalimumab drug level 14 which is within therapeutic range, no antibodies present.  Normal B12 level.  Normal C-reactive protein, vitamin D level 30, normal CMP, anemia with a hemoglobin of 10, normal MCV 82.   Started having diarrhea , watery, scant blood, mild abdominal discomfort, no fever. on Humira weekly. was scheduled for colonoscopy but did not come in , now scheduled for march 2022.  November 2021 visit:    Elevated fecal calprotectin of 187 in September 2021.  Labs in August 2021 B12 430, normal C-reactive protein, low vitamin D 16, TB test negative, normal CMP, hemoglobin low 10.4  completed vit d treatment, now also using otc vit D. Weekly humira. Regular formed stools. on b 12 injections.  no heartburn.  no dysphagia.   July 2021 visit:   On Weekly Humira.Stopped 6MP because this this causes memory issues. She takes this prn when she thinks she get looser stools.  vit D def - didnt take meds since not covered by insurance. Also not recently taking B 12 injections. Did not have fecal calpro done as ordered. 1-2 BM per day. No rectal bleeding. Feels like Humira is working well.   Labs from May 2021 revealed normal vitamin B12 384, ferritin 88, C-reactive protein normal at 4, continued vitamin D deficiency 19.7, normal kidney function, normal liver enzymes, low Humira antibody 29, adequate Humira drug levels 12, slight anemia with a hemoglobin 10.3 otherwise normal CBC.  Fecal calprotectin was not done.  TB test was not done  April 2021:  Recovered from covid 19 without hospitalization in late 2020.   On weekly Humira. Stopped taking Imuran because she thinks this was causing memory loss.  Normal vitamin B12, low vitamin D at 16.9, TB test negative in June 2020, Humira drug levels 13, antibody levels low titer at 58, CRP 2. EGD August 2020 reveals a small amount of food in the gastric body, evidence of fundoplication, normal esophagus, no biopsies. 1 BM per day. No rectal bleeding. No abdominal pain. On monthly vit B 12 injection.  July 2020 : doing imuran 100 mg per day and weekly Humira. took meds for vit D def. on B 12 injections. feels CD is in better control. no abdominal pain. no diarrhea. occasional gerd. EGD in scheduled for next week. Colonoscopy in March 2020 revealed normal neoterminal ileum.  3 large pedunculated pseudopolyps in the transverse colon which were biopsied extensively.  Benign intrinsic appearing stenosis at approximately 60 cm, biopsied, large polypoid lesion at 55 cm also biopsied.  Relatively normal-appearing rectum, rectosigmoid, sigmoid colon and descending colon.  Extensive perianal skin tags.  Perianal skin discoloration.  Multiple semi-pedunculated polyps in the sigmoid colon.  Ulceration at the ileocolonic anastomosis. Biopsies from the anastomosis confirmed active colitis with mild activity.  Colonic stricture at 60 cm also revealed active colitis.  All other biopsies revealed benign mucosa. MR enterography February 2020 Limited exam because of motion artifact, short segment 4 cm thickening and mural enhancement of the terminal ileum.  Short segment of narrowing of the distal sigmoid and hepatic flexure without inflammatory changes.  Long segment thickening of the descending colon.  Images were not obtained to the level of the rectum and anus to evaluate the suspected left perianal and perirectal fistula seen on prior CT. Jan 2020 -  missed humira dose for abt 4 weeks. took it this week on monday. ran out of imuran last week. bowel freq 2-3 times per day. occasional dysphagia.  Dec  2019 -  Was having trouble getting meds from pharmacy, missed humira for 3 weeks. took humira yesterday. remains  on imuran 100 mg daily. Having more diarrhea now than usual. no rectal bleeding. was feeling much better when was regular on humira and imuran. now experiecing flareup symptoms. no fever / abdominal pain.  Labs Nov 2019 : CR 0.66, T BILI <0.2, ALP 60, AST 22, ALT 13, WBC 4.3, HB (10.5), , lipase 41, CRP 1. Oct 2019 -  aug labs reviewed. normal wbc count. Hb 11. normal LFTs. normal amylase and lipase. no diarrhea. no abdominal pain. no rectal bleeding. compliant to meds.  Aug 2019 -  s/p repair of hiatal hernia + fundoplication. stopped meds on her own during periop period ( stopped in early june - restarted in late july). but continued imuran. now back on humira every week and imuran.  normal cbc. normal kidney and liver function. no abdominal pain. occasional diarrhea. no fever. no rectal bleeding.  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 Has bowel movements 1 to 2 times a day, no anal bleeding, no abdominal pain. Presently on humira and imuran and reports some relief of symptoms. Patient is scheduled to see thoracic surgeon and will likely stop the medicine one to two weeks prior to surgery as directed by thoracic surgeon. April 2019 -  CT enterography march 2019  -normal small bowel. suspected perianal / perirectal fistula. very large hiatal hernia containing most of the stomach / part of pancreas and spleen. heterogenous uterus.  Humira dose increased to weekly. Also added imuran. labs march 2019 - low vit D. normal B 12. normal lfts. low ferritin. Hb 9. Fecal calprotectin 106.  Bowels are more formed. feeling better. got injectafor 1 shot. getting 1 more dose next week.  March 2019 -  Colonoscopy in feb 2019 revealed post op anatomy, likely ileocolonic anastomosis in TC. patchy pseudopolyps in entire colon. TC diverticulosis. perianal fistula and skin tags. mucosal bridge in sigmoid colon. Moderately active inflammation was present in ileocolonic anastomosis Bx but remainder colon biopsy was normal. TPMT activity - normal. Slightly lower Humira drug levels ( 7.8).  Humira antibody 196 ( intermediate antibody level) Labs in dec 2018 - Hb 10.4, low b 12 166, normal CMP.  TB neg. Hep B neg. CRP elevated to 18.  low Vit D 10. Continues to have diarrhea. ALso describes abdominal discomfort. Pt feels crohns is not well controlled.  DEC 2018 - Patient was last seen in May 2018 by Dr. Rodriges. On Humira. Last labs from April 2018: hgb 10, normal LFT's, normal renal function.  EGD Nov 2017 : large hiatal hernia, Schatzki's ring. Has previously been on Remicade - but stopped working in Dec 2017. Colonoscopy June 2017 : inflammed colon, ulcerated iliocolonic anastamosis, normal neoti pseudopolyps in the colon noted. Path: confirmed inflammation from ileal biopsy, colonic biopsy did not reveal any active crohn's disease. CD was diagnosed at age 11. On Humira in early 2018. Feels like its working. Bowel freq 5-6 times per day, loose stools. No FH of colon cancer. noticing hypopigmentation on skin. s/p right hemicolectomy in 1989. Perianal fistula not draining as bad as before.

## 2025-01-16 NOTE — HPI-TODAY'S VISIT:
January 2025 visit:  Labs from July 2024 showed B12 levels 429, normal C-reactive protein, vitamin D 31, fecal calprotectin 48, TB QuantiFERON negative, normal CMP, normal iron studies with ferritin 22, slight anemia with a hemoglobin of 10.3, hepatitis surface antigen negative  on humira weekly. some days but rare she gets diarrhea. takes lomotil prn. she is intolerant to po iron. she has yet to see hematologist for low iron

## 2025-01-29 LAB
% SATURATION: 14
A/G RATIO: 0.9
ALBUMIN: 4.1
ALKALINE PHOSPHATASE: 67
ALT (SGPT): 13
AST (SGOT): 16
BILIRUBIN, TOTAL: 0.3
BUN/CREATININE RATIO: (no result)
BUN: 12
C-REACTIVE PROTEIN, QUANT: <3
CALCIUM: 9.7
CARBON DIOXIDE, TOTAL: 25
CHLORIDE: 107
CREATININE: 0.67
EGFR: 106
FERRITIN: 20
GLOBULIN, TOTAL: 4.4
GLUCOSE: 87
HEMATOCRIT: 33
HEMOGLOBIN: 10.8
IRON BINDING CAPACITY: 404
IRON, TOTAL: 55
MCH: 26.9
MCHC: 32.7
MCV: 82.3
MPV: 11.3
PLATELET COUNT: 274
POTASSIUM: 3.7
PROTEIN, TOTAL: 8.5
RDW: 15.6
RED BLOOD CELL COUNT: 4.01
SODIUM: 141
VITAMIN D,25-OH,TOTAL,IA: 31
WHITE BLOOD CELL COUNT: 5.6

## 2025-01-30 ENCOUNTER — TELEPHONE ENCOUNTER (OUTPATIENT)
Dept: URBAN - METROPOLITAN AREA CLINIC 25 | Facility: CLINIC | Age: 50
End: 2025-01-30

## 2025-02-07 ENCOUNTER — LAB OUTSIDE AN ENCOUNTER (OUTPATIENT)
Dept: URBAN - METROPOLITAN AREA CLINIC 25 | Facility: CLINIC | Age: 50
End: 2025-02-07

## 2025-02-07 ENCOUNTER — TELEPHONE ENCOUNTER (OUTPATIENT)
Dept: URBAN - METROPOLITAN AREA CLINIC 25 | Facility: CLINIC | Age: 50
End: 2025-02-07

## 2025-02-07 RX ORDER — POLYETHYLENE GLYCOL 3350, SODIUM SULFATE ANHYDROUS, SODIUM BICARBONATE, SODIUM CHLORIDE, POTASSIUM CHLORIDE 236; 22.74; 6.74; 5.86; 2.97 G/4L; G/4L; G/4L; G/4L; G/4L
AS DIRECTED POWDER, FOR SOLUTION ORAL ONCE
Qty: 1 | Refills: 0 | OUTPATIENT
Start: 2025-02-07 | End: 2025-02-08

## 2025-02-18 ENCOUNTER — OFFICE VISIT (OUTPATIENT)
Dept: URBAN - METROPOLITAN AREA CLINIC 83 | Facility: CLINIC | Age: 50
End: 2025-02-18
Payer: COMMERCIAL

## 2025-02-18 DIAGNOSIS — E53.8 B12 DEFICIENCY: ICD-10-CM

## 2025-02-18 PROCEDURE — 96372 THER/PROPH/DIAG INJ SC/IM: CPT | Performed by: INTERNAL MEDICINE

## 2025-02-18 RX ORDER — DIPHENOXYLATE HYDROCHLORIDE AND ATROPINE SULFATE 2.5; .025 MG/1; MG/1
1 TABLET AS NEEDED TABLET ORAL
Status: ON HOLD | COMMUNITY
Start: 2022-03-24

## 2025-02-18 RX ORDER — ADALIMUMAB 40MG/0.4ML
1 SUBQ Q WEEK KIT SUBCUTANEOUS
Qty: 4 | Refills: 11 | Status: ACTIVE | COMMUNITY
End: 2025-05-12

## 2025-02-18 RX ORDER — DIPHENOXYLATE HYDROCHLORIDE AND ATROPINE SULFATE 2.5; .025 MG/1; MG/1
1 TABLET AS NEEDED TABLET ORAL
Status: ACTIVE | COMMUNITY

## 2025-02-18 RX ORDER — BUDESONIDE 3 MG/1
3 CAPSULES CAPSULE, COATED PELLETS ORAL ONCE A DAY
Qty: 90 CAPSULE | Refills: 1 | Status: ON HOLD | COMMUNITY
Start: 2022-02-18

## 2025-02-28 ENCOUNTER — TELEPHONE ENCOUNTER (OUTPATIENT)
Dept: URBAN - METROPOLITAN AREA CLINIC 84 | Facility: CLINIC | Age: 50
End: 2025-02-28

## 2025-03-02 ENCOUNTER — TELEPHONE ENCOUNTER (OUTPATIENT)
Dept: URBAN - METROPOLITAN AREA CLINIC 25 | Facility: CLINIC | Age: 50
End: 2025-03-02

## 2025-03-02 ENCOUNTER — ERX REFILL RESPONSE (OUTPATIENT)
Dept: URBAN - METROPOLITAN AREA CLINIC 25 | Facility: CLINIC | Age: 50
End: 2025-03-02

## 2025-03-02 RX ORDER — ADALIMUMAB 40MG/0.4ML
1 SUBQ Q WEEK KIT SUBCUTANEOUS
Qty: 4 | Refills: 11 | OUTPATIENT

## 2025-03-02 RX ORDER — ADALIMUMAB 40MG/0.4ML
1 SUBQ Q WEEK KIT SUBCUTANEOUS
Qty: 4 | Refills: 11
End: 2026-02-25

## 2025-03-19 ENCOUNTER — OFFICE VISIT (OUTPATIENT)
Dept: URBAN - METROPOLITAN AREA CLINIC 83 | Facility: CLINIC | Age: 50
End: 2025-03-19
Payer: COMMERCIAL

## 2025-03-19 VITALS — BODY MASS INDEX: 31.1 KG/M2 | WEIGHT: 210 LBS | HEIGHT: 69 IN

## 2025-03-19 DIAGNOSIS — E53.8 VITAMIN B12 DEFICIENCY: ICD-10-CM

## 2025-03-19 PROCEDURE — 96372 THER/PROPH/DIAG INJ SC/IM: CPT | Performed by: INTERNAL MEDICINE

## 2025-03-19 RX ORDER — BUDESONIDE 3 MG/1
3 CAPSULES CAPSULE, COATED PELLETS ORAL ONCE A DAY
Qty: 90 CAPSULE | Refills: 1 | Status: ON HOLD | COMMUNITY
Start: 2022-02-18

## 2025-03-19 RX ORDER — DIPHENOXYLATE HYDROCHLORIDE AND ATROPINE SULFATE 2.5; .025 MG/1; MG/1
1 TABLET AS NEEDED TABLET ORAL
Status: ON HOLD | COMMUNITY
Start: 2022-03-24

## 2025-03-19 RX ORDER — ADALIMUMAB 40MG/0.4ML
1 SUBQ Q WEEK KIT SUBCUTANEOUS
Qty: 4 | Refills: 11 | Status: ACTIVE | COMMUNITY
End: 2026-02-25

## 2025-03-19 RX ORDER — DIPHENOXYLATE HYDROCHLORIDE AND ATROPINE SULFATE 2.5; .025 MG/1; MG/1
1 TABLET AS NEEDED TABLET ORAL
Status: ACTIVE | COMMUNITY

## 2025-04-07 ENCOUNTER — TELEPHONE ENCOUNTER (OUTPATIENT)
Dept: URBAN - METROPOLITAN AREA CLINIC 84 | Facility: CLINIC | Age: 50
End: 2025-04-07

## 2025-04-10 ENCOUNTER — OFFICE VISIT (OUTPATIENT)
Dept: URBAN - METROPOLITAN AREA SURGERY CENTER 20 | Facility: SURGERY CENTER | Age: 50
End: 2025-04-10

## 2025-04-16 ENCOUNTER — OFFICE VISIT (OUTPATIENT)
Dept: URBAN - METROPOLITAN AREA CLINIC 83 | Facility: CLINIC | Age: 50
End: 2025-04-16

## 2025-05-29 ENCOUNTER — OFFICE VISIT (OUTPATIENT)
Dept: URBAN - METROPOLITAN AREA SURGERY CENTER 20 | Facility: SURGERY CENTER | Age: 50
End: 2025-05-29

## 2025-07-10 ENCOUNTER — TELEPHONE ENCOUNTER (OUTPATIENT)
Dept: URBAN - METROPOLITAN AREA CLINIC 25 | Facility: CLINIC | Age: 50
End: 2025-07-10

## 2025-07-10 RX ORDER — ADALIMUMAB 40MG/0.4ML
ONCE A WEEK KIT SUBCUTANEOUS
Qty: 4 | Refills: 11 | OUTPATIENT
Start: 2025-07-10 | End: 2026-06-11

## 2025-08-25 ENCOUNTER — OFFICE VISIT (OUTPATIENT)
Dept: URBAN - METROPOLITAN AREA CLINIC 84 | Facility: CLINIC | Age: 50
End: 2025-08-25
Payer: COMMERCIAL

## 2025-08-25 DIAGNOSIS — E56.9 VITAMIN D DEFICIENCY: ICD-10-CM

## 2025-08-25 DIAGNOSIS — Z79.899 ADALIMUMAB (HUMIRA) LONG-TERM USE: ICD-10-CM

## 2025-08-25 DIAGNOSIS — E53.8 VITAMIN B 12 DEFICIENCY: ICD-10-CM

## 2025-08-25 DIAGNOSIS — K44.9 HIATAL HERNIA - S/P SURGERY: ICD-10-CM

## 2025-08-25 DIAGNOSIS — K50.80 CROHN'S DISEASE OF BOTH SMALL AND LARGE INTESTINE WITHOUT COMPLICATION: ICD-10-CM

## 2025-08-25 DIAGNOSIS — R19.7 INTERMITTENT DIARRHEA: ICD-10-CM

## 2025-08-25 DIAGNOSIS — K50.118 CROHN'S DISEASE OF PERIANAL REGION WITH OTHER COMPLICATION: ICD-10-CM

## 2025-08-25 DIAGNOSIS — K21.9 CHRONIC GERD: ICD-10-CM

## 2025-08-25 PROCEDURE — 99214 OFFICE O/P EST MOD 30 MIN: CPT | Performed by: INTERNAL MEDICINE

## 2025-08-25 RX ORDER — ADALIMUMAB 40MG/0.4ML
ONCE A WEEK KIT SUBCUTANEOUS
Qty: 4 | Refills: 11 | Status: ACTIVE | COMMUNITY
Start: 2025-07-10 | End: 2026-06-11

## 2025-08-25 RX ORDER — BUDESONIDE 3 MG/1
3 CAPSULES CAPSULE, COATED PELLETS ORAL ONCE A DAY
Qty: 90 CAPSULE | Refills: 1 | Status: ON HOLD | COMMUNITY
Start: 2022-02-18

## 2025-08-25 RX ORDER — DIPHENOXYLATE HYDROCHLORIDE AND ATROPINE SULFATE 2.5; .025 MG/1; MG/1
1 TABLET AS NEEDED TABLET ORAL
Status: ACTIVE | COMMUNITY